# Patient Record
Sex: MALE | Race: WHITE | Employment: OTHER
[De-identification: names, ages, dates, MRNs, and addresses within clinical notes are randomized per-mention and may not be internally consistent; named-entity substitution may affect disease eponyms.]

---

## 2023-07-11 ENCOUNTER — HOSPITAL ENCOUNTER (OUTPATIENT)
Facility: HOSPITAL | Age: 68
Discharge: HOME | End: 2023-07-14
Payer: MEDICARE

## 2023-07-11 DIAGNOSIS — M54.12 CERVICAL RADICULOPATHY: ICD-10-CM

## 2023-07-11 DIAGNOSIS — M48.02 CERVICAL STENOSIS OF SPINE: ICD-10-CM

## 2023-07-11 PROCEDURE — 72141 MRI NECK SPINE W/O DYE: CPT

## 2023-10-18 ENCOUNTER — HOSPITAL ENCOUNTER (OUTPATIENT)
Facility: HOSPITAL | Age: 68
Discharge: HOME | End: 2023-10-21
Payer: MEDICARE

## 2023-10-18 VITALS
DIASTOLIC BLOOD PRESSURE: 80 MMHG | BODY MASS INDEX: 21.47 KG/M2 | TEMPERATURE: 97.8 F | SYSTOLIC BLOOD PRESSURE: 151 MMHG | HEART RATE: 55 BPM | WEIGHT: 150 LBS | HEIGHT: 70 IN

## 2023-10-18 LAB
ABO + RH BLD: NORMAL
ANION GAP SERPL CALC-SCNC: 4 MMOL/L (ref 5–15)
APPEARANCE UR: CLEAR
BACTERIA URNS QL MICRO: NEGATIVE /HPF
BILIRUB UR QL: NEGATIVE
BLOOD GROUP ANTIBODIES SERPL: NORMAL
BUN SERPL-MCNC: 8 MG/DL (ref 6–20)
BUN/CREAT SERPL: 9 (ref 12–20)
CALCIUM SERPL-MCNC: 9.7 MG/DL (ref 8.5–10.1)
CHLORIDE SERPL-SCNC: 106 MMOL/L (ref 97–108)
CO2 SERPL-SCNC: 26 MMOL/L (ref 21–32)
COLOR UR: NORMAL
CREAT SERPL-MCNC: 0.87 MG/DL (ref 0.7–1.3)
EPITH CASTS URNS QL MICRO: NORMAL /LPF
ERYTHROCYTE [DISTWIDTH] IN BLOOD BY AUTOMATED COUNT: 12.4 % (ref 11.5–14.5)
EST. AVERAGE GLUCOSE BLD GHB EST-MCNC: 105 MG/DL
GLUCOSE SERPL-MCNC: 115 MG/DL (ref 65–100)
GLUCOSE UR STRIP.AUTO-MCNC: NEGATIVE MG/DL
HBA1C MFR BLD: 5.3 % (ref 4–5.6)
HCT VFR BLD AUTO: 39.4 % (ref 36.6–50.3)
HGB BLD-MCNC: 13.4 G/DL (ref 12.1–17)
HGB UR QL STRIP: NEGATIVE
HYALINE CASTS URNS QL MICRO: NORMAL /LPF (ref 0–5)
INR PPP: 1 (ref 0.9–1.1)
KETONES UR QL STRIP.AUTO: NEGATIVE MG/DL
LEUKOCYTE ESTERASE UR QL STRIP.AUTO: NEGATIVE
MCH RBC QN AUTO: 32.7 PG (ref 26–34)
MCHC RBC AUTO-ENTMCNC: 34 G/DL (ref 30–36.5)
MCV RBC AUTO: 96.1 FL (ref 80–99)
NITRITE UR QL STRIP.AUTO: NEGATIVE
NRBC # BLD: 0 K/UL (ref 0–0.01)
NRBC BLD-RTO: 0 PER 100 WBC
PH UR STRIP: 7 (ref 5–8)
PLATELET # BLD AUTO: 322 K/UL (ref 150–400)
PMV BLD AUTO: 9.3 FL (ref 8.9–12.9)
POTASSIUM SERPL-SCNC: 4.1 MMOL/L (ref 3.5–5.1)
PROT UR STRIP-MCNC: NEGATIVE MG/DL
PROTHROMBIN TIME: 10.6 SEC (ref 9–11.1)
RBC # BLD AUTO: 4.1 M/UL (ref 4.1–5.7)
RBC #/AREA URNS HPF: NORMAL /HPF (ref 0–5)
SODIUM SERPL-SCNC: 136 MMOL/L (ref 136–145)
SP GR UR REFRACTOMETRY: 1 (ref 1–1.03)
SPECIMEN EXP DATE BLD: NORMAL
URINE CULTURE IF INDICATED: NORMAL
UROBILINOGEN UR QL STRIP.AUTO: 0.2 EU/DL (ref 0.2–1)
WBC # BLD AUTO: 6.8 K/UL (ref 4.1–11.1)
WBC URNS QL MICRO: NORMAL /HPF (ref 0–4)

## 2023-10-18 PROCEDURE — APPNB30 APP NON BILLABLE TIME 0-30 MINS: Performed by: NURSE PRACTITIONER

## 2023-10-18 PROCEDURE — 36415 COLL VENOUS BLD VENIPUNCTURE: CPT

## 2023-10-18 PROCEDURE — 80048 BASIC METABOLIC PNL TOTAL CA: CPT

## 2023-10-18 PROCEDURE — 86850 RBC ANTIBODY SCREEN: CPT

## 2023-10-18 PROCEDURE — 86900 BLOOD TYPING SEROLOGIC ABO: CPT

## 2023-10-18 PROCEDURE — 86901 BLOOD TYPING SEROLOGIC RH(D): CPT

## 2023-10-18 PROCEDURE — 85610 PROTHROMBIN TIME: CPT

## 2023-10-18 PROCEDURE — 81001 URINALYSIS AUTO W/SCOPE: CPT

## 2023-10-18 PROCEDURE — 83036 HEMOGLOBIN GLYCOSYLATED A1C: CPT

## 2023-10-18 PROCEDURE — 85027 COMPLETE CBC AUTOMATED: CPT

## 2023-10-18 RX ORDER — UBIDECARENONE 200 MG
1 CAPSULE ORAL DAILY
COMMUNITY

## 2023-10-18 NOTE — PERIOP NOTE
[id number] [address] Burrell [facility] INSTRUCTIONS  ORTHOPAEDIC    Surgery Date:   10/25/23    Your surgeon's office or [facility] staff will call you between 4 PM- 8 PM the day before surgery with your arrival time. If your surgery is on a Monday, you will receive a call the preceding Friday. If your surgeon's office has given you, your arrival time then go by that time. Please report to [facility] Patient Access/Admitting on the 1st floor. Bring your insurance card, photo identification, and any copayment (if applicable). If you are going home the same day of your surgery, you must have a responsible adult to drive you home. You need to have a responsible adult to stay with you the first 24 hours after surgery and you should not drive a car for 24 hours following your surgery. If you are being admitted to the hospital,please leave personal belongings/luggage in your car until you have an assigned hospital room number. Please wear comfortable clothes. Wear your glasses instead of contacts. We ask that all money, jewelry and valuables be left at home. Wear no make up, particularly mascara, the day of surgery. Do NOT drink alcohol or smoke 24 hours before surgery. STOP smoking for 14 days prior as it helps with breathing and healing after surgery. All body piercings, rings, and jewelry need to be removed and left at home. Do not wear any fingernail polish except for clear. Please wear your hair loose or down. Please no pony-tails, buns, or any metal hair accessories. You may wear deodorant. Do not shave any body area within 24 hours of your surgery. Please follow all instructions to avoid any potential surgical cancellation. Should your physical condition change, (i.e. fever, cold, flu, etc.) please notify your surgeon as soon as possible. It is important to be on time. If a situation occurs where you may be delayed, please call:  [phone] / [phone] on the day of surgery.   The Preadmission

## 2023-10-19 LAB
BACTERIA SPEC CULT: NORMAL
BACTERIA SPEC CULT: NORMAL
SERVICE CMNT-IMP: NORMAL

## 2023-10-25 ENCOUNTER — ANESTHESIA EVENT (OUTPATIENT)
Facility: HOSPITAL | Age: 68
End: 2023-10-25
Payer: MEDICARE

## 2023-10-25 ENCOUNTER — APPOINTMENT (OUTPATIENT)
Facility: HOSPITAL | Age: 68
End: 2023-10-25
Attending: ORTHOPAEDIC SURGERY
Payer: MEDICARE

## 2023-10-25 ENCOUNTER — HOSPITAL ENCOUNTER (OUTPATIENT)
Facility: HOSPITAL | Age: 68
Discharge: HOME | End: 2023-10-26
Attending: ORTHOPAEDIC SURGERY | Admitting: ORTHOPAEDIC SURGERY
Payer: MEDICARE

## 2023-10-25 ENCOUNTER — ANESTHESIA (OUTPATIENT)
Facility: HOSPITAL | Age: 68
End: 2023-10-25
Payer: MEDICARE

## 2023-10-25 DIAGNOSIS — Z98.1 S/P CERVICAL SPINAL FUSION: Primary | ICD-10-CM

## 2023-10-25 PROBLEM — M48.02 CERVICAL STENOSIS OF SPINAL CANAL: Status: ACTIVE | Noted: 2023-10-25

## 2023-10-25 LAB
GLUCOSE BLD STRIP.AUTO-MCNC: 105 MG/DL (ref 65–117)
SERVICE CMNT-IMP: NORMAL

## 2023-10-25 PROCEDURE — C1713 ANCHOR/SCREW BN/BN,TIS/BN: HCPCS | Performed by: ORTHOPAEDIC SURGERY

## 2023-10-25 PROCEDURE — 6360000002 HC RX W HCPCS: Performed by: STUDENT IN AN ORGANIZED HEALTH CARE EDUCATION/TRAINING PROGRAM

## 2023-10-25 PROCEDURE — 2580000003 HC RX 258: Performed by: NURSE ANESTHETIST, CERTIFIED REGISTERED

## 2023-10-25 PROCEDURE — 6360000002 HC RX W HCPCS: Performed by: PHYSICIAN ASSISTANT

## 2023-10-25 PROCEDURE — 22552 ARTHRD ANT NTRBD CERVICAL EA: CPT | Performed by: STUDENT IN AN ORGANIZED HEALTH CARE EDUCATION/TRAINING PROGRAM

## 2023-10-25 PROCEDURE — 2500000003 HC RX 250 WO HCPCS: Performed by: ORTHOPAEDIC SURGERY

## 2023-10-25 PROCEDURE — 2580000003 HC RX 258: Performed by: ORTHOPAEDIC SURGERY

## 2023-10-25 PROCEDURE — C1889 IMPLANT/INSERT DEVICE, NOC: HCPCS | Performed by: ORTHOPAEDIC SURGERY

## 2023-10-25 PROCEDURE — 2580000003 HC RX 258: Performed by: STUDENT IN AN ORGANIZED HEALTH CARE EDUCATION/TRAINING PROGRAM

## 2023-10-25 PROCEDURE — 22845 INSERT SPINE FIXATION DEVICE: CPT | Performed by: STUDENT IN AN ORGANIZED HEALTH CARE EDUCATION/TRAINING PROGRAM

## 2023-10-25 PROCEDURE — 3600000014 HC SURGERY LEVEL 4 ADDTL 15MIN: Performed by: ORTHOPAEDIC SURGERY

## 2023-10-25 PROCEDURE — 22552 ARTHRD ANT NTRBD CERVICAL EA: CPT | Performed by: ORTHOPAEDIC SURGERY

## 2023-10-25 PROCEDURE — 3700000000 HC ANESTHESIA ATTENDED CARE: Performed by: ORTHOPAEDIC SURGERY

## 2023-10-25 PROCEDURE — 3700000001 HC ADD 15 MINUTES (ANESTHESIA): Performed by: ORTHOPAEDIC SURGERY

## 2023-10-25 PROCEDURE — 22853 INSJ BIOMECHANICAL DEVICE: CPT | Performed by: STUDENT IN AN ORGANIZED HEALTH CARE EDUCATION/TRAINING PROGRAM

## 2023-10-25 PROCEDURE — 22551 ARTHRD ANT NTRBDY CERVICAL: CPT | Performed by: ORTHOPAEDIC SURGERY

## 2023-10-25 PROCEDURE — 2500000003 HC RX 250 WO HCPCS: Performed by: NURSE ANESTHETIST, CERTIFIED REGISTERED

## 2023-10-25 PROCEDURE — 82962 GLUCOSE BLOOD TEST: CPT

## 2023-10-25 PROCEDURE — 22853 INSJ BIOMECHANICAL DEVICE: CPT | Performed by: ORTHOPAEDIC SURGERY

## 2023-10-25 PROCEDURE — 2580000003 HC RX 258: Performed by: PHYSICIAN ASSISTANT

## 2023-10-25 PROCEDURE — 6360000002 HC RX W HCPCS: Performed by: NURSE ANESTHETIST, CERTIFIED REGISTERED

## 2023-10-25 PROCEDURE — 6370000000 HC RX 637 (ALT 250 FOR IP): Performed by: STUDENT IN AN ORGANIZED HEALTH CARE EDUCATION/TRAINING PROGRAM

## 2023-10-25 PROCEDURE — 2720000010 HC SURG SUPPLY STERILE: Performed by: ORTHOPAEDIC SURGERY

## 2023-10-25 PROCEDURE — 7100000000 HC PACU RECOVERY - FIRST 15 MIN: Performed by: ORTHOPAEDIC SURGERY

## 2023-10-25 PROCEDURE — 2709999900 HC NON-CHARGEABLE SUPPLY: Performed by: ORTHOPAEDIC SURGERY

## 2023-10-25 PROCEDURE — 3600000004 HC SURGERY LEVEL 4 BASE: Performed by: ORTHOPAEDIC SURGERY

## 2023-10-25 PROCEDURE — 7100000001 HC PACU RECOVERY - ADDTL 15 MIN: Performed by: ORTHOPAEDIC SURGERY

## 2023-10-25 PROCEDURE — 22551 ARTHRD ANT NTRBDY CERVICAL: CPT | Performed by: STUDENT IN AN ORGANIZED HEALTH CARE EDUCATION/TRAINING PROGRAM

## 2023-10-25 PROCEDURE — 22845 INSERT SPINE FIXATION DEVICE: CPT | Performed by: ORTHOPAEDIC SURGERY

## 2023-10-25 DEVICE — GRAFT BNE SUB SM CANC FRZN MORSELIZED W/ VIABLE CELL: Type: IMPLANTABLE DEVICE | Site: SPINE CERVICAL | Status: FUNCTIONAL

## 2023-10-25 DEVICE — I-FACTOR PUTTY, 1.0CC SYRINGE
Type: IMPLANTABLE DEVICE | Site: SPINE CERVICAL | Status: FUNCTIONAL
Brand: I-FACTOR PEPTIDE ENHANCED BONE GRAFT

## 2023-10-25 DEVICE — SCREW 7713515 ZEVO VAR SD 3.5MM X 15MM
Type: IMPLANTABLE DEVICE | Site: SPINE CERVICAL | Status: FUNCTIONAL
Brand: ZEVO™ ANTERIOR CERVICAL PLATE SYSTEM

## 2023-10-25 DEVICE — GRAFT HUM TISS 3X4 CM WND COVERING AMNIO MEMBRN VERSASHIELD: Type: IMPLANTABLE DEVICE | Site: SPINE CERVICAL | Status: FUNCTIONAL

## 2023-10-25 DEVICE — INTERBODY FUSION DEVICE  6 DEGREE MEDIUM 8MM
Type: IMPLANTABLE DEVICE | Site: SPINE CERVICAL | Status: FUNCTIONAL
Brand: ENDOSKELETON® TC NANOLOCK® SURFACE TECHNOLOGY

## 2023-10-25 RX ORDER — ONDANSETRON 2 MG/ML
4 INJECTION INTRAMUSCULAR; INTRAVENOUS EVERY 6 HOURS PRN
Status: DISCONTINUED | OUTPATIENT
Start: 2023-10-25 | End: 2023-10-26 | Stop reason: HOSPADM

## 2023-10-25 RX ORDER — ATENOLOL 50 MG/1
50 TABLET ORAL DAILY
Status: DISCONTINUED | OUTPATIENT
Start: 2023-10-25 | End: 2023-10-26 | Stop reason: HOSPADM

## 2023-10-25 RX ORDER — SODIUM CHLORIDE, SODIUM LACTATE, POTASSIUM CHLORIDE, CALCIUM CHLORIDE 600; 310; 30; 20 MG/100ML; MG/100ML; MG/100ML; MG/100ML
INJECTION, SOLUTION INTRAVENOUS CONTINUOUS
Status: DISCONTINUED | OUTPATIENT
Start: 2023-10-25 | End: 2023-10-26 | Stop reason: HOSPADM

## 2023-10-25 RX ORDER — SODIUM CHLORIDE 0.9 % (FLUSH) 0.9 %
5-40 SYRINGE (ML) INJECTION PRN
Status: DISCONTINUED | OUTPATIENT
Start: 2023-10-25 | End: 2023-10-25 | Stop reason: HOSPADM

## 2023-10-25 RX ORDER — ONDANSETRON 4 MG/1
4 TABLET, ORALLY DISINTEGRATING ORAL EVERY 8 HOURS PRN
Status: DISCONTINUED | OUTPATIENT
Start: 2023-10-25 | End: 2023-10-26 | Stop reason: HOSPADM

## 2023-10-25 RX ORDER — SODIUM CHLORIDE 9 MG/ML
INJECTION, SOLUTION INTRAVENOUS PRN
Status: DISCONTINUED | OUTPATIENT
Start: 2023-10-25 | End: 2023-10-26 | Stop reason: HOSPADM

## 2023-10-25 RX ORDER — HYDROMORPHONE HYDROCHLORIDE 1 MG/ML
INJECTION, SOLUTION INTRAMUSCULAR; INTRAVENOUS; SUBCUTANEOUS PRN
Status: DISCONTINUED | OUTPATIENT
Start: 2023-10-25 | End: 2023-10-25 | Stop reason: SDUPTHER

## 2023-10-25 RX ORDER — SODIUM CHLORIDE 0.9 % (FLUSH) 0.9 %
5-40 SYRINGE (ML) INJECTION EVERY 12 HOURS SCHEDULED
Status: DISCONTINUED | OUTPATIENT
Start: 2023-10-25 | End: 2023-10-25 | Stop reason: HOSPADM

## 2023-10-25 RX ORDER — MORPHINE SULFATE 2 MG/ML
2 INJECTION, SOLUTION INTRAMUSCULAR; INTRAVENOUS
Status: DISCONTINUED | OUTPATIENT
Start: 2023-10-25 | End: 2023-10-26 | Stop reason: HOSPADM

## 2023-10-25 RX ORDER — ASPIRIN 81 MG/1
81 TABLET ORAL DAILY
Status: DISCONTINUED | OUTPATIENT
Start: 2023-10-26 | End: 2023-10-26 | Stop reason: HOSPADM

## 2023-10-25 RX ORDER — ROCURONIUM BROMIDE 10 MG/ML
INJECTION, SOLUTION INTRAVENOUS PRN
Status: DISCONTINUED | OUTPATIENT
Start: 2023-10-25 | End: 2023-10-25 | Stop reason: SDUPTHER

## 2023-10-25 RX ORDER — CYCLOBENZAPRINE HCL 10 MG
10 TABLET ORAL EVERY 12 HOURS PRN
Status: DISCONTINUED | OUTPATIENT
Start: 2023-10-25 | End: 2023-10-26 | Stop reason: HOSPADM

## 2023-10-25 RX ORDER — SODIUM CHLORIDE, SODIUM LACTATE, POTASSIUM CHLORIDE, AND CALCIUM CHLORIDE .6; .31; .03; .02 G/100ML; G/100ML; G/100ML; G/100ML
INJECTION, SOLUTION INTRAVENOUS PRN
Status: DISCONTINUED | OUTPATIENT
Start: 2023-10-25 | End: 2023-10-25 | Stop reason: SDUPTHER

## 2023-10-25 RX ORDER — OXYCODONE HYDROCHLORIDE 5 MG/1
5 TABLET ORAL EVERY 4 HOURS PRN
Status: DISCONTINUED | OUTPATIENT
Start: 2023-10-25 | End: 2023-10-26 | Stop reason: HOSPADM

## 2023-10-25 RX ORDER — AMLODIPINE BESYLATE 5 MG/1
5 TABLET ORAL DAILY
Status: DISCONTINUED | OUTPATIENT
Start: 2023-10-25 | End: 2023-10-26 | Stop reason: HOSPADM

## 2023-10-25 RX ORDER — DIPHENHYDRAMINE HCL 25 MG
25 CAPSULE ORAL EVERY 6 HOURS PRN
Status: DISCONTINUED | OUTPATIENT
Start: 2023-10-25 | End: 2023-10-26 | Stop reason: HOSPADM

## 2023-10-25 RX ORDER — MIDAZOLAM HYDROCHLORIDE 1 MG/ML
INJECTION INTRAMUSCULAR; INTRAVENOUS PRN
Status: DISCONTINUED | OUTPATIENT
Start: 2023-10-25 | End: 2023-10-25 | Stop reason: SDUPTHER

## 2023-10-25 RX ORDER — SENNA AND DOCUSATE SODIUM 50; 8.6 MG/1; MG/1
1 TABLET, FILM COATED ORAL 2 TIMES DAILY
Status: DISCONTINUED | OUTPATIENT
Start: 2023-10-25 | End: 2023-10-26 | Stop reason: HOSPADM

## 2023-10-25 RX ORDER — SODIUM CHLORIDE 9 MG/ML
INJECTION, SOLUTION INTRAVENOUS CONTINUOUS
Status: DISCONTINUED | OUTPATIENT
Start: 2023-10-25 | End: 2023-10-26 | Stop reason: HOSPADM

## 2023-10-25 RX ORDER — LISINOPRIL 10 MG/1
5 TABLET ORAL DAILY
Status: DISCONTINUED | OUTPATIENT
Start: 2023-10-25 | End: 2023-10-26 | Stop reason: HOSPADM

## 2023-10-25 RX ORDER — ISOSORBIDE MONONITRATE 30 MG/1
30 TABLET, EXTENDED RELEASE ORAL EVERY MORNING
Status: DISCONTINUED | OUTPATIENT
Start: 2023-10-25 | End: 2023-10-26 | Stop reason: HOSPADM

## 2023-10-25 RX ORDER — UBIDECARENONE 200 MG
1 CAPSULE ORAL DAILY
Status: DISCONTINUED | OUTPATIENT
Start: 2023-10-25 | End: 2023-10-25 | Stop reason: CLARIF

## 2023-10-25 RX ORDER — RANOLAZINE 500 MG/1
500 TABLET, EXTENDED RELEASE ORAL 2 TIMES DAILY
Status: DISCONTINUED | OUTPATIENT
Start: 2023-10-25 | End: 2023-10-26 | Stop reason: HOSPADM

## 2023-10-25 RX ORDER — KETAMINE HCL IN NACL, ISO-OSM 100MG/10ML
SYRINGE (ML) INJECTION PRN
Status: DISCONTINUED | OUTPATIENT
Start: 2023-10-25 | End: 2023-10-25 | Stop reason: SDUPTHER

## 2023-10-25 RX ORDER — LIDOCAINE HYDROCHLORIDE 20 MG/ML
INJECTION, SOLUTION EPIDURAL; INFILTRATION; INTRACAUDAL; PERINEURAL PRN
Status: DISCONTINUED | OUTPATIENT
Start: 2023-10-25 | End: 2023-10-25 | Stop reason: SDUPTHER

## 2023-10-25 RX ORDER — FAMOTIDINE 20 MG/1
20 TABLET, FILM COATED ORAL 2 TIMES DAILY
Status: DISCONTINUED | OUTPATIENT
Start: 2023-10-25 | End: 2023-10-26 | Stop reason: HOSPADM

## 2023-10-25 RX ORDER — SODIUM CHLORIDE 0.9 % (FLUSH) 0.9 %
5-40 SYRINGE (ML) INJECTION EVERY 12 HOURS SCHEDULED
Status: DISCONTINUED | OUTPATIENT
Start: 2023-10-25 | End: 2023-10-26 | Stop reason: HOSPADM

## 2023-10-25 RX ORDER — FENTANYL CITRATE 50 UG/ML
INJECTION, SOLUTION INTRAMUSCULAR; INTRAVENOUS PRN
Status: DISCONTINUED | OUTPATIENT
Start: 2023-10-25 | End: 2023-10-25 | Stop reason: SDUPTHER

## 2023-10-25 RX ORDER — ONDANSETRON 2 MG/ML
INJECTION INTRAMUSCULAR; INTRAVENOUS PRN
Status: DISCONTINUED | OUTPATIENT
Start: 2023-10-25 | End: 2023-10-25 | Stop reason: SDUPTHER

## 2023-10-25 RX ORDER — SODIUM CHLORIDE 0.9 % (FLUSH) 0.9 %
5-40 SYRINGE (ML) INJECTION PRN
Status: DISCONTINUED | OUTPATIENT
Start: 2023-10-25 | End: 2023-10-26 | Stop reason: HOSPADM

## 2023-10-25 RX ORDER — OXYCODONE HYDROCHLORIDE 5 MG/1
10 TABLET ORAL EVERY 4 HOURS PRN
Status: DISCONTINUED | OUTPATIENT
Start: 2023-10-25 | End: 2023-10-26 | Stop reason: HOSPADM

## 2023-10-25 RX ORDER — ACETAMINOPHEN 500 MG
1000 TABLET ORAL EVERY 6 HOURS
Status: DISCONTINUED | OUTPATIENT
Start: 2023-10-25 | End: 2023-10-26 | Stop reason: HOSPADM

## 2023-10-25 RX ORDER — HYDRALAZINE HYDROCHLORIDE 20 MG/ML
INJECTION INTRAMUSCULAR; INTRAVENOUS PRN
Status: DISCONTINUED | OUTPATIENT
Start: 2023-10-25 | End: 2023-10-25 | Stop reason: SDUPTHER

## 2023-10-25 RX ORDER — SUCCINYLCHOLINE/SOD CL,ISO/PF 200MG/10ML
SYRINGE (ML) INTRAVENOUS PRN
Status: DISCONTINUED | OUTPATIENT
Start: 2023-10-25 | End: 2023-10-25 | Stop reason: SDUPTHER

## 2023-10-25 RX ORDER — PROPOFOL 10 MG/ML
INJECTION, EMULSION INTRAVENOUS PRN
Status: DISCONTINUED | OUTPATIENT
Start: 2023-10-25 | End: 2023-10-25 | Stop reason: SDUPTHER

## 2023-10-25 RX ORDER — DIPHENHYDRAMINE HYDROCHLORIDE 50 MG/ML
25 INJECTION INTRAMUSCULAR; INTRAVENOUS EVERY 6 HOURS PRN
Status: DISCONTINUED | OUTPATIENT
Start: 2023-10-25 | End: 2023-10-26 | Stop reason: HOSPADM

## 2023-10-25 RX ORDER — POLYETHYLENE GLYCOL 3350 17 G/17G
17 POWDER, FOR SOLUTION ORAL DAILY
Status: DISCONTINUED | OUTPATIENT
Start: 2023-10-25 | End: 2023-10-26 | Stop reason: HOSPADM

## 2023-10-25 RX ORDER — PHENYLEPHRINE HCL IN 0.9% NACL 0.4MG/10ML
SYRINGE (ML) INTRAVENOUS PRN
Status: DISCONTINUED | OUTPATIENT
Start: 2023-10-25 | End: 2023-10-25 | Stop reason: SDUPTHER

## 2023-10-25 RX ORDER — DEXAMETHASONE SODIUM PHOSPHATE 4 MG/ML
INJECTION, SOLUTION INTRA-ARTICULAR; INTRALESIONAL; INTRAMUSCULAR; INTRAVENOUS; SOFT TISSUE PRN
Status: DISCONTINUED | OUTPATIENT
Start: 2023-10-25 | End: 2023-10-25 | Stop reason: SDUPTHER

## 2023-10-25 RX ORDER — ATORVASTATIN CALCIUM 40 MG/1
40 TABLET, FILM COATED ORAL DAILY
Status: DISCONTINUED | OUTPATIENT
Start: 2023-10-25 | End: 2023-10-26 | Stop reason: HOSPADM

## 2023-10-25 RX ORDER — SODIUM CHLORIDE 9 MG/ML
INJECTION, SOLUTION INTRAVENOUS PRN
Status: DISCONTINUED | OUTPATIENT
Start: 2023-10-25 | End: 2023-10-25 | Stop reason: HOSPADM

## 2023-10-25 RX ADMIN — AMLODIPINE BESYLATE 5 MG: 5 TABLET ORAL at 12:22

## 2023-10-25 RX ADMIN — ACETAMINOPHEN 1000 MG: 500 TABLET ORAL at 17:25

## 2023-10-25 RX ADMIN — ROCURONIUM BROMIDE 5 MG: 10 INJECTION, SOLUTION INTRAVENOUS at 07:42

## 2023-10-25 RX ADMIN — MIDAZOLAM 2 MG: 1 INJECTION INTRAMUSCULAR; INTRAVENOUS at 07:31

## 2023-10-25 RX ADMIN — HYDROMORPHONE HYDROCHLORIDE 0.5 MG: 1 INJECTION, SOLUTION INTRAMUSCULAR; INTRAVENOUS; SUBCUTANEOUS at 08:05

## 2023-10-25 RX ADMIN — POLYETHYLENE GLYCOL 3350 17 G: 17 POWDER, FOR SOLUTION ORAL at 12:25

## 2023-10-25 RX ADMIN — PROPOFOL 140 MG: 10 INJECTION, EMULSION INTRAVENOUS at 08:12

## 2023-10-25 RX ADMIN — ONDANSETRON 4 MG: 2 INJECTION INTRAMUSCULAR; INTRAVENOUS at 09:18

## 2023-10-25 RX ADMIN — DEXAMETHASONE SODIUM PHOSPHATE 4 MG: 4 INJECTION INTRA-ARTICULAR; INTRALESIONAL; INTRAMUSCULAR; INTRAVENOUS; SOFT TISSUE at 07:46

## 2023-10-25 RX ADMIN — Medication 120 MCG: at 08:56

## 2023-10-25 RX ADMIN — SODIUM CHLORIDE, POTASSIUM CHLORIDE, SODIUM LACTATE AND CALCIUM CHLORIDE: 600; 310; 30; 20 INJECTION, SOLUTION INTRAVENOUS at 06:45

## 2023-10-25 RX ADMIN — LIDOCAINE HYDROCHLORIDE 70 MG: 20 INJECTION, SOLUTION EPIDURAL; INFILTRATION; INTRACAUDAL; PERINEURAL at 07:42

## 2023-10-25 RX ADMIN — ATORVASTATIN CALCIUM 40 MG: 40 TABLET, FILM COATED ORAL at 12:23

## 2023-10-25 RX ADMIN — Medication 10 MG: at 08:15

## 2023-10-25 RX ADMIN — SENNOSIDES AND DOCUSATE SODIUM 1 TABLET: 8.6; 5 TABLET ORAL at 12:25

## 2023-10-25 RX ADMIN — PROPOFOL 50 MG: 10 INJECTION, EMULSION INTRAVENOUS at 08:11

## 2023-10-25 RX ADMIN — Medication 140 MG: at 07:42

## 2023-10-25 RX ADMIN — HYDROMORPHONE HYDROCHLORIDE 0.5 MG: 1 INJECTION, SOLUTION INTRAMUSCULAR; INTRAVENOUS; SUBCUTANEOUS at 08:12

## 2023-10-25 RX ADMIN — SODIUM CHLORIDE, POTASSIUM CHLORIDE, SODIUM LACTATE AND CALCIUM CHLORIDE 450 ML: 600; 310; 30; 20 INJECTION, SOLUTION INTRAVENOUS at 08:31

## 2023-10-25 RX ADMIN — Medication 80 MCG: at 09:03

## 2023-10-25 RX ADMIN — ISOSORBIDE MONONITRATE 30 MG: 30 TABLET, EXTENDED RELEASE ORAL at 12:27

## 2023-10-25 RX ADMIN — HYDRALAZINE HYDROCHLORIDE 6 MG: 20 INJECTION INTRAMUSCULAR; INTRAVENOUS at 08:29

## 2023-10-25 RX ADMIN — FENTANYL CITRATE 50 MCG: 50 INJECTION, SOLUTION INTRAMUSCULAR; INTRAVENOUS at 07:50

## 2023-10-25 RX ADMIN — RANOLAZINE 500 MG: 500 TABLET, FILM COATED, EXTENDED RELEASE ORAL at 12:27

## 2023-10-25 RX ADMIN — LISINOPRIL 5 MG: 10 TABLET ORAL at 12:22

## 2023-10-25 RX ADMIN — SODIUM CHLORIDE: 9 INJECTION, SOLUTION INTRAVENOUS at 10:09

## 2023-10-25 RX ADMIN — PROPOFOL 150 MG: 10 INJECTION, EMULSION INTRAVENOUS at 07:42

## 2023-10-25 RX ADMIN — Medication 10 MG: at 08:11

## 2023-10-25 RX ADMIN — CYCLOBENZAPRINE 10 MG: 10 TABLET, FILM COATED ORAL at 12:22

## 2023-10-25 RX ADMIN — Medication 10 MG: at 08:19

## 2023-10-25 RX ADMIN — ATENOLOL 50 MG: 50 TABLET ORAL at 12:27

## 2023-10-25 RX ADMIN — WATER 2000 MG: 1 INJECTION INTRAMUSCULAR; INTRAVENOUS; SUBCUTANEOUS at 07:52

## 2023-10-25 RX ADMIN — FAMOTIDINE 20 MG: 20 TABLET ORAL at 20:58

## 2023-10-25 RX ADMIN — WATER 2000 MG: 1 INJECTION INTRAMUSCULAR; INTRAVENOUS; SUBCUTANEOUS at 17:22

## 2023-10-25 RX ADMIN — Medication 10 MG: at 08:23

## 2023-10-25 RX ADMIN — RANOLAZINE 500 MG: 500 TABLET, FILM COATED, EXTENDED RELEASE ORAL at 20:58

## 2023-10-25 RX ADMIN — FAMOTIDINE 20 MG: 20 TABLET ORAL at 12:23

## 2023-10-25 RX ADMIN — Medication 10 MG: at 08:07

## 2023-10-25 RX ADMIN — SODIUM CHLORIDE, POTASSIUM CHLORIDE, SODIUM LACTATE AND CALCIUM CHLORIDE 350 ML: 600; 310; 30; 20 INJECTION, SOLUTION INTRAVENOUS at 09:42

## 2023-10-25 RX ADMIN — SENNOSIDES AND DOCUSATE SODIUM 1 TABLET: 8.6; 5 TABLET ORAL at 20:58

## 2023-10-25 RX ADMIN — FENTANYL CITRATE 50 MCG: 50 INJECTION, SOLUTION INTRAMUSCULAR; INTRAVENOUS at 07:49

## 2023-10-25 RX ADMIN — ACETAMINOPHEN 1000 MG: 500 TABLET ORAL at 12:21

## 2023-10-25 RX ADMIN — PROPOFOL 140 MCG/KG/MIN: 10 INJECTION, EMULSION INTRAVENOUS at 07:45

## 2023-10-25 ASSESSMENT — PAIN - FUNCTIONAL ASSESSMENT: PAIN_FUNCTIONAL_ASSESSMENT: 0-10

## 2023-10-25 NOTE — OP NOTE
Neuromonitoring remained stable after each implant was placed. Weight was removed off the head. We then contoured a 37-mm anterior cervical plate. A temporary fixation with threaded pin, followed by a 3.5 x 17 mm screws in C4 for fixation and then 3.5 x 15 mm in C5-C6 fixation. Locking mechanisms were engaged. X-rays demonstrated stable alignment and fixation with restoration of normal alignment and disk space height. Wounds were thoroughly irrigated. FloSeal for hemostasis. VersaShield for anti-adhesions. 3-0 Monocryl on the skin and Steri-Strips were placed. A soft collar was placed. The patient returned to the PACU in stable condition.       Isabela Pearl MD      [id number]  D:  10/25/2023 9:17  T:  10/25/2023 9:56  JOB #:  [id number]

## 2023-10-25 NOTE — BRIEF OP NOTE
Brief Postoperative Note      Patient: Temo Starr  YOB: 1955  MRN: [id number]    Date of Procedure: 10/25/2023    Pre-Op Diagnosis Codes:     * Cervical radiculopathy [M54.12]     * Cervical spinal stenosis [M48.02]    Post-Op Diagnosis: Same       Procedure(s):  ANTERIOR CERVICAL DISCECTOMY AND FUSION C4-5 AND C5-6    Surgeon(s):  Lex Alvarado MD    Assistant:  Physician Assistant: ROLA Wallace    Anesthesia: General    Estimated Blood Loss (mL): Minimal    Complications: None    Specimens:   * No specimens in log *    Implants:  Implant Name Type Inv. Item Serial No.  Lot No. LRB No. Used Action   GRAFT BNE SUB SM CANC FRZN MORSELIZED W/ VIABLE CELL - [id number]  GRAFT BNE SUB SM CANC FRZN MORSELIZED W/ VIABLE CELL [id number] [employer]- NA N/A 1 Implanted   GRAFT HUM TISS 3X4 CM WND COVERING AMNIO MEMBRN VERSASHIELD - [id number]  GRAFT HUM TISS 3X4 CM WND COVERING AMNIO MEMBRN VERSASHIELD [id number] [employer]- NA N/A 1 Implanted   PUTTY BNE GRFT 1 CC W/ SYR I FACTOR - SNA  PUTTY BNE GRFT 1 CC W/ SYR I FACTOR NA [employer] [id number] N/A 1 Implanted   CAGE SPNL 6 DEG MED [id number] MM ENDOSKELETON TC [place] - SNA  CAGE SPNL 6 DEG MED [id number] MM ENDOSKELETON TC [place] NA [employer] SOFAM[employer] DANEK-WD [id number] N/A 1 Implanted   CAGE SPNL 6 DEG MED [id number] MM ENDOSKELETON TC [place] - SNA  CAGE SPNL 6 DEG MED [id number] MM ENDOSKELETON TC [place] NA [employer] SOFAMOR DANEK-WD [id number] N/A 1 Implanted   SCREW SPNL L15MM DIA3. 5MM ANT CERV TI SELF DRL ANNIE ANG - SNA  SCREW SPNL L15MM DIA3. 5MM ANT CERV TI SELF DRL ANNIE ANG NA [employer] SOFAMOR DANEK-WD NA N/A 4 Implanted   PLATE SPNL F39UV LEV 2 ANT CERV TI ZEVO - SNA  PLATE SPNL L51PC LEV 2 ANT CERV TI ZEVO NA [employer] SOFAMOR DANEK-WD NA N/A 1 Implanted   SCREW SPNL L17MM DIA3. 5MM ANT CERV TI SELF DRL ANNIE ANG - SNA  SCREW SPNL L17MM

## 2023-10-25 NOTE — DISCHARGE INSTRUCTIONS
After Hospital Care Plan:  Discharge Instructions Cervical (Neck) Spine Surgery Dr. Anna Burns    Patient Name: Teddy Ramos    Date of procedure: 10/25/23  Date of discharge: 10/25/2023    Procedure: Procedure(s):  ANTERIOR CERVICAL DISCECTOMY AND FUSION C4-5 AND C5-6      Follow up appointments   -follow up with Dr. Anna Burns in 2 weeks. Call [phone] to make an appointment as soon as you get home from the hospital.    When to call your Orthopaedic Surgeon:  -Difficulty swallowing that is worse than when you left the hospital.  -Signs of infection-if your incision is red; continues to have drainage; drainage has a foul odor or if you have a persistent fever over 101 degrees for 24 hours  -nausea or vomiting, severe headache  -changes in sensation in your arms or legs (numbness, tingling, loss of color)  -increased weakness-greater than before your surgery  -severe pain or pain not relieved by medications  -Signs of a blood clot in your leg-calf pain, tenderness, redness, swelling of lower leg    When to call your Primary Care Physician:  -Concerns about medical conditions such as diabetes, high blood pressure, asthma, congestive heart failure  -Call if blood sugars are elevated, persistent headache or dizziness, coughing or congestion, constipation or diarrhea, burning with urination, abnormal heart rate    When to call 911 and go to the nearest emergency room:  -acute onset of chest pain, shortness of breath, difficulty breathing, increased difficulty swallowing    Activity  - You are going home a well person, be as active as possible. Your only exercise should be walking. Start with short frequent walks and increase your walking distance each day.  -Limit the amount of time you sit to 20-30 minute intervals. Sitting for prolonged periods of time will be uncomfortable for you following surgery.   - Do NOT lift anything over 5 pounds  -From now on, even when lifting light weight, bend with your

## 2023-10-26 VITALS
BODY MASS INDEX: 22.2 KG/M2 | WEIGHT: 149.91 LBS | DIASTOLIC BLOOD PRESSURE: 79 MMHG | HEIGHT: 69 IN | OXYGEN SATURATION: 97 % | TEMPERATURE: 98 F | RESPIRATION RATE: 15 BRPM | SYSTOLIC BLOOD PRESSURE: 150 MMHG | HEART RATE: 59 BPM

## 2023-10-26 PROCEDURE — 6370000000 HC RX 637 (ALT 250 FOR IP): Performed by: STUDENT IN AN ORGANIZED HEALTH CARE EDUCATION/TRAINING PROGRAM

## 2023-10-26 PROCEDURE — 97161 PT EVAL LOW COMPLEX 20 MIN: CPT

## 2023-10-26 PROCEDURE — 97116 GAIT TRAINING THERAPY: CPT

## 2023-10-26 PROCEDURE — 2580000003 HC RX 258: Performed by: STUDENT IN AN ORGANIZED HEALTH CARE EDUCATION/TRAINING PROGRAM

## 2023-10-26 PROCEDURE — 6360000002 HC RX W HCPCS: Performed by: STUDENT IN AN ORGANIZED HEALTH CARE EDUCATION/TRAINING PROGRAM

## 2023-10-26 RX ORDER — OXYCODONE HYDROCHLORIDE 5 MG/1
5 TABLET ORAL EVERY 4 HOURS PRN
Qty: 20 TABLET | Refills: 0 | Status: SHIPPED | OUTPATIENT
Start: 2023-10-26 | End: 2023-11-02

## 2023-10-26 RX ADMIN — LISINOPRIL 5 MG: 10 TABLET ORAL at 09:20

## 2023-10-26 RX ADMIN — AMLODIPINE BESYLATE 5 MG: 5 TABLET ORAL at 09:20

## 2023-10-26 RX ADMIN — RANOLAZINE 500 MG: 500 TABLET, FILM COATED, EXTENDED RELEASE ORAL at 09:21

## 2023-10-26 RX ADMIN — WATER 2000 MG: 1 INJECTION INTRAMUSCULAR; INTRAVENOUS; SUBCUTANEOUS at 00:30

## 2023-10-26 RX ADMIN — SENNOSIDES AND DOCUSATE SODIUM 1 TABLET: 8.6; 5 TABLET ORAL at 09:20

## 2023-10-26 RX ADMIN — POLYETHYLENE GLYCOL 3350 17 G: 17 POWDER, FOR SOLUTION ORAL at 09:21

## 2023-10-26 RX ADMIN — ATORVASTATIN CALCIUM 40 MG: 40 TABLET, FILM COATED ORAL at 09:20

## 2023-10-26 RX ADMIN — ISOSORBIDE MONONITRATE 30 MG: 30 TABLET, EXTENDED RELEASE ORAL at 09:20

## 2023-10-26 RX ADMIN — OXYCODONE HYDROCHLORIDE 10 MG: 5 TABLET ORAL at 12:01

## 2023-10-26 RX ADMIN — ASPIRIN 81 MG: 81 TABLET, COATED ORAL at 09:20

## 2023-10-26 RX ADMIN — TICAGRELOR 90 MG: 90 TABLET ORAL at 09:20

## 2023-10-26 RX ADMIN — FAMOTIDINE 20 MG: 20 TABLET ORAL at 09:21

## 2023-10-26 RX ADMIN — ATENOLOL 50 MG: 50 TABLET ORAL at 09:20

## 2023-10-26 RX ADMIN — SODIUM CHLORIDE, PRESERVATIVE FREE 10 ML: 5 INJECTION INTRAVENOUS at 09:22

## 2023-10-26 RX ADMIN — ACETAMINOPHEN 1000 MG: 500 TABLET ORAL at 12:01

## 2023-10-26 RX ADMIN — ACETAMINOPHEN 1000 MG: 500 TABLET ORAL at 01:06

## 2023-10-26 ASSESSMENT — PAIN DESCRIPTION - LOCATION: LOCATION: NECK

## 2023-10-26 ASSESSMENT — PAIN SCALES - GENERAL: PAINLEVEL_OUTOF10: 6

## 2023-10-26 ASSESSMENT — PAIN DESCRIPTION - DESCRIPTORS: DESCRIPTORS: ACHING

## 2023-10-26 NOTE — PLAN OF CARE
Problem: Pain  Goal: Verbalizes/displays adequate comfort level or baseline comfort level  Outcome: HH/HSPC Resolved Met     Problem: Safety - Adult  Goal: Free from fall injury  Outcome: [facility] 114 Resolved Met     Problem: Discharge Planning  Goal: Discharge to home or other facility with appropriate resources  Outcome: [facility] 114 Resolved Met

## 2023-12-26 ENCOUNTER — APPOINTMENT (OUTPATIENT)
Facility: HOSPITAL | Age: 68
End: 2023-12-26
Payer: MEDICARE

## 2023-12-26 ENCOUNTER — HOSPITAL ENCOUNTER (OUTPATIENT)
Facility: HOSPITAL | Age: 68
Setting detail: OBSERVATION
Discharge: HOME | End: 2023-12-28
Attending: STUDENT IN AN ORGANIZED HEALTH CARE EDUCATION/TRAINING PROGRAM | Admitting: ORTHOPAEDIC SURGERY
Payer: MEDICARE

## 2023-12-26 DIAGNOSIS — S82.852A TRIMALLEOLAR FRACTURE OF ANKLE, CLOSED, LEFT, INITIAL ENCOUNTER: Primary | ICD-10-CM

## 2023-12-26 DIAGNOSIS — S82.852A CLOSED FRACTURE OF ANKLE, TRIMALLEOLAR, LEFT, INITIAL ENCOUNTER: ICD-10-CM

## 2023-12-26 LAB
ABO + RH BLD: NORMAL
ANION GAP SERPL CALC-SCNC: 7 MMOL/L (ref 5–15)
BASOPHILS # BLD: 0 K/UL (ref 0–0.1)
BASOPHILS NFR BLD: 0 % (ref 0–1)
BLOOD GROUP ANTIBODIES SERPL: NORMAL
BUN SERPL-MCNC: 8 MG/DL (ref 6–20)
BUN/CREAT SERPL: 14 (ref 12–20)
CALCIUM SERPL-MCNC: 9.1 MG/DL (ref 8.5–10.1)
CHLORIDE SERPL-SCNC: 106 MMOL/L (ref 97–108)
CO2 SERPL-SCNC: 24 MMOL/L (ref 21–32)
CREAT SERPL-MCNC: 0.59 MG/DL (ref 0.7–1.3)
DIFFERENTIAL METHOD BLD: ABNORMAL
EOSINOPHIL # BLD: 0.1 K/UL (ref 0–0.4)
EOSINOPHIL NFR BLD: 1 % (ref 0–7)
ERYTHROCYTE [DISTWIDTH] IN BLOOD BY AUTOMATED COUNT: 12.8 % (ref 11.5–14.5)
GLUCOSE SERPL-MCNC: 114 MG/DL (ref 65–100)
HCT VFR BLD AUTO: 36.1 % (ref 36.6–50.3)
HGB BLD-MCNC: 12.3 G/DL (ref 12.1–17)
IMM GRANULOCYTES # BLD AUTO: 0.1 K/UL (ref 0–0.04)
IMM GRANULOCYTES NFR BLD AUTO: 1 % (ref 0–0.5)
LYMPHOCYTES # BLD: 1.8 K/UL (ref 0.8–3.5)
LYMPHOCYTES NFR BLD: 17 % (ref 12–49)
MCH RBC QN AUTO: 32.9 PG (ref 26–34)
MCHC RBC AUTO-ENTMCNC: 34.1 G/DL (ref 30–36.5)
MCV RBC AUTO: 96.5 FL (ref 80–99)
MONOCYTES # BLD: 1 K/UL (ref 0–1)
MONOCYTES NFR BLD: 9 % (ref 5–13)
NEUTS SEG # BLD: 7.5 K/UL (ref 1.8–8)
NEUTS SEG NFR BLD: 72 % (ref 32–75)
NRBC # BLD: 0 K/UL (ref 0–0.01)
NRBC BLD-RTO: 0 PER 100 WBC
PLATELET # BLD AUTO: 279 K/UL (ref 150–400)
PMV BLD AUTO: 8.9 FL (ref 8.9–12.9)
POTASSIUM SERPL-SCNC: 3.7 MMOL/L (ref 3.5–5.1)
RBC # BLD AUTO: 3.74 M/UL (ref 4.1–5.7)
SODIUM SERPL-SCNC: 137 MMOL/L (ref 136–145)
SPECIMEN EXP DATE BLD: NORMAL
WBC # BLD AUTO: 10.5 K/UL (ref 4.1–11.1)

## 2023-12-26 PROCEDURE — 71045 X-RAY EXAM CHEST 1 VIEW: CPT

## 2023-12-26 PROCEDURE — 2580000003 HC RX 258: Performed by: PHYSICIAN ASSISTANT

## 2023-12-26 PROCEDURE — 96376 TX/PRO/DX INJ SAME DRUG ADON: CPT

## 2023-12-26 PROCEDURE — 36415 COLL VENOUS BLD VENIPUNCTURE: CPT

## 2023-12-26 PROCEDURE — 86901 BLOOD TYPING SEROLOGIC RH(D): CPT

## 2023-12-26 PROCEDURE — 85025 COMPLETE CBC W/AUTO DIFF WBC: CPT

## 2023-12-26 PROCEDURE — 73610 X-RAY EXAM OF ANKLE: CPT

## 2023-12-26 PROCEDURE — 86900 BLOOD TYPING SEROLOGIC ABO: CPT

## 2023-12-26 PROCEDURE — G0378 HOSPITAL OBSERVATION PER HR: HCPCS

## 2023-12-26 PROCEDURE — 96374 THER/PROPH/DIAG INJ IV PUSH: CPT

## 2023-12-26 PROCEDURE — 80048 BASIC METABOLIC PNL TOTAL CA: CPT

## 2023-12-26 PROCEDURE — 2580000003 HC RX 258: Performed by: STUDENT IN AN ORGANIZED HEALTH CARE EDUCATION/TRAINING PROGRAM

## 2023-12-26 PROCEDURE — 86850 RBC ANTIBODY SCREEN: CPT

## 2023-12-26 PROCEDURE — 6360000002 HC RX W HCPCS: Performed by: PHYSICIAN ASSISTANT

## 2023-12-26 PROCEDURE — 6370000000 HC RX 637 (ALT 250 FOR IP): Performed by: STUDENT IN AN ORGANIZED HEALTH CARE EDUCATION/TRAINING PROGRAM

## 2023-12-26 PROCEDURE — G0378 HOSPITAL OBSERVATION PER HR: HCPCS | Performed by: ORTHOPAEDIC SURGERY

## 2023-12-26 PROCEDURE — 6370000000 HC RX 637 (ALT 250 FOR IP): Performed by: PHYSICIAN ASSISTANT

## 2023-12-26 PROCEDURE — 96375 TX/PRO/DX INJ NEW DRUG ADDON: CPT

## 2023-12-26 PROCEDURE — 2500000003 HC RX 250 WO HCPCS: Performed by: PHYSICIAN ASSISTANT

## 2023-12-26 PROCEDURE — 99285 EMERGENCY DEPT VISIT HI MDM: CPT

## 2023-12-26 PROCEDURE — 6360000002 HC RX W HCPCS: Performed by: STUDENT IN AN ORGANIZED HEALTH CARE EDUCATION/TRAINING PROGRAM

## 2023-12-26 PROCEDURE — 93005 ELECTROCARDIOGRAM TRACING: CPT | Performed by: PHYSICIAN ASSISTANT

## 2023-12-26 RX ORDER — SODIUM CHLORIDE 9 MG/ML
INJECTION, SOLUTION INTRAVENOUS PRN
Status: DISCONTINUED | OUTPATIENT
Start: 2023-12-26 | End: 2023-12-28 | Stop reason: HOSPADM

## 2023-12-26 RX ORDER — ASPIRIN 81 MG/1
81 TABLET ORAL DAILY
Status: DISCONTINUED | OUTPATIENT
Start: 2023-12-27 | End: 2023-12-28 | Stop reason: HOSPADM

## 2023-12-26 RX ORDER — HYDROMORPHONE HYDROCHLORIDE 1 MG/ML
0.5 INJECTION, SOLUTION INTRAMUSCULAR; INTRAVENOUS; SUBCUTANEOUS ONCE
Status: COMPLETED | OUTPATIENT
Start: 2023-12-26 | End: 2023-12-26

## 2023-12-26 RX ORDER — ATORVASTATIN CALCIUM 40 MG/1
40 TABLET, FILM COATED ORAL DAILY
Status: DISCONTINUED | OUTPATIENT
Start: 2023-12-27 | End: 2023-12-28 | Stop reason: HOSPADM

## 2023-12-26 RX ORDER — SODIUM CHLORIDE 0.9 % (FLUSH) 0.9 %
5-40 SYRINGE (ML) INJECTION EVERY 12 HOURS SCHEDULED
Status: DISCONTINUED | OUTPATIENT
Start: 2023-12-26 | End: 2023-12-28 | Stop reason: HOSPADM

## 2023-12-26 RX ORDER — KETAMINE HCL IN NACL, ISO-OSM 100MG/10ML
0.4 SYRINGE (ML) INJECTION ONCE
Status: COMPLETED | OUTPATIENT
Start: 2023-12-26 | End: 2023-12-26

## 2023-12-26 RX ORDER — OXYCODONE HYDROCHLORIDE 5 MG/1
5 TABLET ORAL EVERY 4 HOURS PRN
Status: DISCONTINUED | OUTPATIENT
Start: 2023-12-26 | End: 2023-12-28 | Stop reason: HOSPADM

## 2023-12-26 RX ORDER — AMPICILLIN TRIHYDRATE 250 MG
100 CAPSULE ORAL DAILY
Status: DISCONTINUED | OUTPATIENT
Start: 2023-12-27 | End: 2023-12-28 | Stop reason: HOSPADM

## 2023-12-26 RX ORDER — ACETAMINOPHEN 500 MG
500 TABLET ORAL EVERY 6 HOURS
Status: DISCONTINUED | OUTPATIENT
Start: 2023-12-26 | End: 2023-12-28 | Stop reason: HOSPADM

## 2023-12-26 RX ORDER — LISINOPRIL 5 MG/1
5 TABLET ORAL DAILY
Status: DISCONTINUED | OUTPATIENT
Start: 2023-12-27 | End: 2023-12-28 | Stop reason: HOSPADM

## 2023-12-26 RX ORDER — AMLODIPINE BESYLATE 5 MG/1
5 TABLET ORAL DAILY
Status: DISCONTINUED | OUTPATIENT
Start: 2023-12-27 | End: 2023-12-28 | Stop reason: HOSPADM

## 2023-12-26 RX ORDER — ISOSORBIDE MONONITRATE 30 MG/1
30 TABLET, EXTENDED RELEASE ORAL EVERY MORNING
Status: DISCONTINUED | OUTPATIENT
Start: 2023-12-27 | End: 2023-12-28 | Stop reason: HOSPADM

## 2023-12-26 RX ORDER — POTASSIUM CHLORIDE 20 MEQ/1
40 TABLET, EXTENDED RELEASE ORAL PRN
Status: DISCONTINUED | OUTPATIENT
Start: 2023-12-26 | End: 2023-12-28 | Stop reason: HOSPADM

## 2023-12-26 RX ORDER — OMEGA-3/DHA/EPA/FISH OIL 60 MG-90MG
500 CAPSULE ORAL DAILY
Status: DISCONTINUED | OUTPATIENT
Start: 2023-12-26 | End: 2023-12-26 | Stop reason: CLARIF

## 2023-12-26 RX ORDER — RANOLAZINE 500 MG/1
500 TABLET, EXTENDED RELEASE ORAL 2 TIMES DAILY
Status: DISCONTINUED | OUTPATIENT
Start: 2023-12-26 | End: 2023-12-28 | Stop reason: HOSPADM

## 2023-12-26 RX ORDER — SODIUM CHLORIDE 9 MG/ML
INJECTION, SOLUTION INTRAVENOUS CONTINUOUS
Status: DISCONTINUED | OUTPATIENT
Start: 2023-12-26 | End: 2023-12-28 | Stop reason: HOSPADM

## 2023-12-26 RX ORDER — MAGNESIUM SULFATE IN WATER 40 MG/ML
2000 INJECTION, SOLUTION INTRAVENOUS PRN
Status: DISCONTINUED | OUTPATIENT
Start: 2023-12-26 | End: 2023-12-28 | Stop reason: HOSPADM

## 2023-12-26 RX ORDER — SODIUM CHLORIDE 0.9 % (FLUSH) 0.9 %
5-40 SYRINGE (ML) INJECTION PRN
Status: DISCONTINUED | OUTPATIENT
Start: 2023-12-26 | End: 2023-12-28 | Stop reason: HOSPADM

## 2023-12-26 RX ORDER — ONDANSETRON 2 MG/ML
4 INJECTION INTRAMUSCULAR; INTRAVENOUS EVERY 6 HOURS PRN
Status: DISCONTINUED | OUTPATIENT
Start: 2023-12-26 | End: 2023-12-28 | Stop reason: HOSPADM

## 2023-12-26 RX ORDER — ATENOLOL 25 MG/1
50 TABLET ORAL DAILY
Status: DISCONTINUED | OUTPATIENT
Start: 2023-12-27 | End: 2023-12-28 | Stop reason: HOSPADM

## 2023-12-26 RX ORDER — ONDANSETRON 4 MG/1
4 TABLET, ORALLY DISINTEGRATING ORAL EVERY 8 HOURS PRN
Status: DISCONTINUED | OUTPATIENT
Start: 2023-12-26 | End: 2023-12-28 | Stop reason: HOSPADM

## 2023-12-26 RX ORDER — OXYCODONE HYDROCHLORIDE AND ACETAMINOPHEN 5; 325 MG/1; MG/1
1 TABLET ORAL
Status: COMPLETED | OUTPATIENT
Start: 2023-12-26 | End: 2023-12-26

## 2023-12-26 RX ORDER — POLYETHYLENE GLYCOL 3350 17 G/17G
17 POWDER, FOR SOLUTION ORAL DAILY PRN
Status: DISCONTINUED | OUTPATIENT
Start: 2023-12-26 | End: 2023-12-28 | Stop reason: HOSPADM

## 2023-12-26 RX ORDER — POTASSIUM CHLORIDE 7.45 MG/ML
10 INJECTION INTRAVENOUS PRN
Status: DISCONTINUED | OUTPATIENT
Start: 2023-12-26 | End: 2023-12-28 | Stop reason: HOSPADM

## 2023-12-26 RX ORDER — MORPHINE SULFATE 2 MG/ML
2 INJECTION, SOLUTION INTRAMUSCULAR; INTRAVENOUS
Status: DISCONTINUED | OUTPATIENT
Start: 2023-12-26 | End: 2023-12-28

## 2023-12-26 RX ORDER — UBIDECARENONE 100 MG
100 CAPSULE ORAL DAILY
COMMUNITY

## 2023-12-26 RX ORDER — 0.9 % SODIUM CHLORIDE 0.9 %
1000 INTRAVENOUS SOLUTION INTRAVENOUS ONCE
Status: COMPLETED | OUTPATIENT
Start: 2023-12-26 | End: 2023-12-26

## 2023-12-26 RX ORDER — OMEGA-3/DHA/EPA/FISH OIL 60 MG-90MG
500 CAPSULE ORAL DAILY
COMMUNITY

## 2023-12-26 RX ORDER — LIDOCAINE HYDROCHLORIDE 10 MG/ML
20 INJECTION, SOLUTION EPIDURAL; INFILTRATION; INTRACAUDAL; PERINEURAL ONCE
Status: COMPLETED | OUTPATIENT
Start: 2023-12-26 | End: 2023-12-26

## 2023-12-26 RX ORDER — FAMOTIDINE 20 MG/1
20 TABLET, FILM COATED ORAL 2 TIMES DAILY
Status: DISCONTINUED | OUTPATIENT
Start: 2023-12-26 | End: 2023-12-28 | Stop reason: HOSPADM

## 2023-12-26 RX ADMIN — SODIUM CHLORIDE 1000 ML: 9 INJECTION, SOLUTION INTRAVENOUS at 15:42

## 2023-12-26 RX ADMIN — HYDROMORPHONE HYDROCHLORIDE 0.5 MG: 1 INJECTION, SOLUTION INTRAMUSCULAR; INTRAVENOUS; SUBCUTANEOUS at 14:46

## 2023-12-26 RX ADMIN — FAMOTIDINE 20 MG: 20 TABLET ORAL at 22:44

## 2023-12-26 RX ADMIN — ACETAMINOPHEN 500 MG: 500 TABLET ORAL at 22:44

## 2023-12-26 RX ADMIN — Medication 27.2 MG: at 14:48

## 2023-12-26 RX ADMIN — SODIUM CHLORIDE: 9 INJECTION, SOLUTION INTRAVENOUS at 18:23

## 2023-12-26 RX ADMIN — OXYCODONE HYDROCHLORIDE 5 MG: 5 TABLET ORAL at 21:19

## 2023-12-26 RX ADMIN — RANOLAZINE 500 MG: 500 TABLET, EXTENDED RELEASE ORAL at 22:44

## 2023-12-26 RX ADMIN — OXYCODONE AND ACETAMINOPHEN 1 TABLET: 5; 325 TABLET ORAL at 13:30

## 2023-12-26 RX ADMIN — PROPOFOL 100 MG: 10 INJECTION, EMULSION INTRAVENOUS at 15:44

## 2023-12-26 RX ADMIN — ACETAMINOPHEN 500 MG: 500 TABLET ORAL at 18:19

## 2023-12-26 RX ADMIN — LIDOCAINE HYDROCHLORIDE 20 ML: 10 INJECTION, SOLUTION EPIDURAL; INFILTRATION; INTRACAUDAL; PERINEURAL at 14:19

## 2023-12-26 RX ADMIN — MORPHINE SULFATE 2 MG: 2 INJECTION, SOLUTION INTRAMUSCULAR; INTRAVENOUS at 19:14

## 2023-12-26 ASSESSMENT — PAIN DESCRIPTION - ORIENTATION
ORIENTATION: LEFT
ORIENTATION: RIGHT

## 2023-12-26 ASSESSMENT — PAIN DESCRIPTION - DESCRIPTORS: DESCRIPTORS: ACHING

## 2023-12-26 ASSESSMENT — PAIN SCALES - GENERAL
PAINLEVEL_OUTOF10: 5
PAINLEVEL_OUTOF10: 4
PAINLEVEL_OUTOF10: 4

## 2023-12-26 ASSESSMENT — PAIN - FUNCTIONAL ASSESSMENT
PAIN_FUNCTIONAL_ASSESSMENT: INTOLERABLE, UNABLE TO DO ANY ACTIVE OR PASSIVE ACTIVITIES
PAIN_FUNCTIONAL_ASSESSMENT: 0-10

## 2023-12-26 ASSESSMENT — PAIN DESCRIPTION - LOCATION
LOCATION: FOOT
LOCATION: ANKLE

## 2023-12-26 NOTE — PROGRESS NOTES
Herbal or dietary supplement products    [address] [place] [address] does not stock herbal or dietary supplement products. The use of such is strongly discouraged due to the lack of regulated consistency of products. These products do not meet FDA or USP standards.     I removed fish oil from the patient's MAR    Thanks  Emmit Kocher, [facility]

## 2023-12-26 NOTE — H&P
H&P    Subjective:     Date of Consultation:  2023      Karen Ace is a 70 y.o. male who is being seen for left ankle fracture/ dislocation Pt reports injury occurred this morning, describes trip/fall. Accompanied by Wife. Retired,  Ambulates at baseline unassisted. Recent cervical fusion by Dr Emma Marinelli    Patient Active Problem List    Diagnosis Date Noted    Closed fracture of ankle, trimalleolar, left, initial encounter 2023    Cervical stenosis of spinal canal 10/25/2023     Family History   Problem Relation Age of Onset    Heart Disease Mother     Arthritis Mother     Heart Disease Father     Heart Attack Father          AT 68    Cancer Brother         TESTICULAR    No Known Problems Brother     Anesth Problems Neg Hx       Social History     Tobacco Use    Smoking status: Some Days     Current packs/day: 0.50     Average packs/day: 0.5 packs/day for 30.0 years (15.0 ttl pk-yrs)     Types: Cigarettes    Smokeless tobacco: Never    Tobacco comments:     3-4 CIGARETTES PER DAY   Substance Use Topics    Alcohol use: Yes     Alcohol/week: 12.0 standard drinks of alcohol     Types: 12 Cans of beer per week     Past Medical History:   Diagnosis Date    Arthritis     CAD (coronary artery disease)     Hypertension       Past Surgical History:   Procedure Laterality Date    BACK SURGERY      LUMBAR LAMINECTOMY    CARDIAC CATHETERIZATION      x5 stents    CERVICAL FUSION N/A 10/25/2023    ANTERIOR CERVICAL DISCECTOMY AND FUSION C4-5 AND C5-6 performed by Moe Garcia MD at [facility] Left     FEMUR BROKE AND REPAIRED AS A CHILD    TONSILLECTOMY        Prior to Admission medications    Medication Sig Start Date End Date Taking?  Authorizing Provider   Coenzyme Q10 (CO Q10) 200 MG CAPS Take 1 tablet by mouth daily    Karely Saucedo MD   KRILL OIL PO Take 500 mg by mouth daily    Karely Saucedo MD   aspirin 81 MG EC tablet Take 1

## 2023-12-26 NOTE — ED PROVIDER NOTES
[facility] 1 ORTHOPEDICS  EMERGENCY DEPARTMENT ENCOUNTER       Pt Name: Donell Alston  MRN: [id number]  Birthdate 1955  Date of evaluation: 12/26/2023  Provider: Kyle Carbone MD   PCP: Jesse Morales MD  Note Started: 1:11 PM EST 12/26/23     CHIEF COMPLAINT       Chief Complaint   Patient presents with    Ankle Injury     Pt states he tripped and fell and injured L ankle. Pt concerned ankle is broken, pt PVS is intact. Pt denies head injury or LOC.         HISTORY OF PRESENT ILLNESS: 1 or more elements      History From: Patient  HPI Limitations: None     Donell Alston is a 68 y.o. male who presents for left ankle injury.  he reports that he tripped and fell into a hole this morning.  He reports he has swelling, pain, bruising to his left ankle.  No medications taken prior to arrival.  Denies head injury.  Denies syncope.  Denies pain elsewhere.  No bleeding or open wounds.  Denies hip pain, knee pain.       Nursing Notes were all reviewed and agreed with or any disagreements were addressed in the HPI.     REVIEW OF SYSTEMS      Review of Systems     Positives and Pertinent negatives as per HPI.    PAST HISTORY     Past Medical History:  Past Medical History:   Diagnosis Date    Arthritis     CAD (coronary artery disease)     Hypertension          Past Surgical History:  Past Surgical History:   Procedure Laterality Date    ANKLE FRACTURE SURGERY Left 12/27/2023    LEFT ANKLE OPEN REDUCTION INTERNAL FIXATION performed by Haresh Farnsworth MD at [facility] MAIN OR    BACK SURGERY  2020    LUMBAR LAMINECTOMY    CARDIAC CATHETERIZATION      x5 stents    CERVICAL FUSION N/A 10/25/2023    ANTERIOR CERVICAL DISCECTOMY AND FUSION C4-5 AND C5-6 performed by Leonard Castillo MD at [facility] MAIN OR    COLONOSCOPY      ORTHOPEDIC SURGERY Left     FEMUR BROKE AND REPAIRED AS A CHILD    TONSILLECTOMY         Family History:  Family History   Problem Relation Age of Onset    Heart Disease Mother     Arthritis Mother     Heart  fracture of ankle, closed, left, initial encounter    2. Closed fracture of ankle, trimalleolar, left, initial encounter          DISPOSITION/PLAN   DISPOSITION Admitted 12/27/2023 04:35:11 PM      Admit Note: Pt is being admitted by Orthopedics. The results of their tests and reason(s) for their admission have been discussed with pt and/or available family. They convey agreement and understanding for the need to be admitted and for the admission diagnosis. I am the Primary Clinician of Record. Светлана Garcia MD (electronically signed)      (Please note that parts of this dictation were completed with voice recognition software. Quite often unanticipated grammatical, syntax, homophones, and other interpretive errors are inadvertently transcribed by the computer software. Please disregards these errors.  Please excuse any errors that have escaped final proofreading.)         Светлана Garcia MD  12/29/23 8221

## 2023-12-26 NOTE — PROGRESS NOTES
Pharmacy Medication Reconciliation     The patient was interviewed regarding current PTA medication list, use and drug allergies;  patient present in room and obtained permission from patient to discuss drug regimen with visitor(s) present. The patient was questioned regarding use of any other inhalers, topical products, over the counter medications, herbal medications, vitamin products or ophthalmic/nasal/otic medication use. Allergy Update: Patient has no known allergies. Recommendations/Findings: The following amendments were made to the patient's active medication list on file at [facility]:   PRospective    Clarified PTA med list with rx query, patient interview.  PTA medication list was corrected to the following:     Prior to Admission Medications   Prescriptions Last Dose Informant   Omega-3 Fatty Acids (FISH OIL) 500 MG CAPS 12/26/2023 Self   Sig: Take 500 mg by mouth daily   amLODIPine (NORVASC) 5 MG tablet 12/26/2023 Self   Sig: Take 1 tablet by mouth daily   aspirin 81 MG EC tablet 12/26/2023 Self   Sig: Take 1 tablet by mouth daily   atenolol (TENORMIN) 50 MG tablet 12/26/2023 Self   Sig: Take 1 tablet by mouth daily   atorvastatin (LIPITOR) 40 MG tablet 12/26/2023 Self   Sig: Take 1 tablet by mouth daily   coenzyme Q10 100 MG CAPS capsule 12/26/2023 Self   Sig: Take 1 capsule by mouth daily   isosorbide mononitrate (IMDUR) 30 MG extended release tablet 12/26/2023 Self   Sig: Take 1 tablet by mouth every morning   lisinopril (PRINIVIL;ZESTRIL) 5 MG tablet 12/26/2023 Self   Sig: Take 1 tablet by mouth daily   ranolazine (RANEXA) 500 MG extended release tablet 12/26/2023 Self   Sig: Take 1 tablet by mouth 2 times daily      Facility-Administered Medications: None        Thank you,  Philip Ramos, [facility]

## 2023-12-26 NOTE — ED NOTES
Ortho at bedside for Left ankle closed reduction. ROLA Urban obtained informed consent for procedure. Pt and family verbalized understanding.

## 2023-12-27 ENCOUNTER — APPOINTMENT (OUTPATIENT)
Facility: HOSPITAL | Age: 68
End: 2023-12-27
Payer: MEDICARE

## 2023-12-27 ENCOUNTER — ANESTHESIA (OUTPATIENT)
Facility: HOSPITAL | Age: 68
End: 2023-12-27
Payer: MEDICARE

## 2023-12-27 ENCOUNTER — ANESTHESIA EVENT (OUTPATIENT)
Facility: HOSPITAL | Age: 68
End: 2023-12-27
Payer: MEDICARE

## 2023-12-27 LAB
ANION GAP SERPL CALC-SCNC: 5 MMOL/L (ref 5–15)
BASOPHILS # BLD: 0.1 K/UL (ref 0–0.1)
BASOPHILS NFR BLD: 1 % (ref 0–1)
BUN SERPL-MCNC: 6 MG/DL (ref 6–20)
BUN/CREAT SERPL: 10 (ref 12–20)
CALCIUM SERPL-MCNC: 9.1 MG/DL (ref 8.5–10.1)
CHLORIDE SERPL-SCNC: 110 MMOL/L (ref 97–108)
CO2 SERPL-SCNC: 25 MMOL/L (ref 21–32)
CREAT SERPL-MCNC: 0.59 MG/DL (ref 0.7–1.3)
DIFFERENTIAL METHOD BLD: ABNORMAL
EKG ATRIAL RATE: 63 BPM
EKG DIAGNOSIS: NORMAL
EKG P AXIS: 60 DEGREES
EKG P-R INTERVAL: 172 MS
EKG Q-T INTERVAL: 468 MS
EKG QRS DURATION: 98 MS
EKG QTC CALCULATION (BAZETT): 478 MS
EKG R AXIS: 61 DEGREES
EKG T AXIS: 49 DEGREES
EKG VENTRICULAR RATE: 63 BPM
EOSINOPHIL # BLD: 0.2 K/UL (ref 0–0.4)
EOSINOPHIL NFR BLD: 3 % (ref 0–7)
ERYTHROCYTE [DISTWIDTH] IN BLOOD BY AUTOMATED COUNT: 13.1 % (ref 11.5–14.5)
GLUCOSE BLD STRIP.AUTO-MCNC: 93 MG/DL (ref 65–117)
GLUCOSE SERPL-MCNC: 89 MG/DL (ref 65–100)
HCT VFR BLD AUTO: 35.2 % (ref 36.6–50.3)
HGB BLD-MCNC: 12.1 G/DL (ref 12.1–17)
IMM GRANULOCYTES # BLD AUTO: 0 K/UL (ref 0–0.04)
IMM GRANULOCYTES NFR BLD AUTO: 0 % (ref 0–0.5)
LYMPHOCYTES # BLD: 2 K/UL (ref 0.8–3.5)
LYMPHOCYTES NFR BLD: 28 % (ref 12–49)
MAGNESIUM SERPL-MCNC: 2.1 MG/DL (ref 1.6–2.4)
MCH RBC QN AUTO: 33.1 PG (ref 26–34)
MCHC RBC AUTO-ENTMCNC: 34.4 G/DL (ref 30–36.5)
MCV RBC AUTO: 96.2 FL (ref 80–99)
MONOCYTES # BLD: 0.8 K/UL (ref 0–1)
MONOCYTES NFR BLD: 12 % (ref 5–13)
NEUTS SEG # BLD: 3.9 K/UL (ref 1.8–8)
NEUTS SEG NFR BLD: 56 % (ref 32–75)
NRBC # BLD: 0 K/UL (ref 0–0.01)
NRBC BLD-RTO: 0 PER 100 WBC
PLATELET # BLD AUTO: 252 K/UL (ref 150–400)
PMV BLD AUTO: 9 FL (ref 8.9–12.9)
POTASSIUM SERPL-SCNC: 3.4 MMOL/L (ref 3.5–5.1)
RBC # BLD AUTO: 3.66 M/UL (ref 4.1–5.7)
SERVICE CMNT-IMP: NORMAL
SODIUM SERPL-SCNC: 140 MMOL/L (ref 136–145)
WBC # BLD AUTO: 7 K/UL (ref 4.1–11.1)

## 2023-12-27 PROCEDURE — 3700000000 HC ANESTHESIA ATTENDED CARE: Performed by: ORTHOPAEDIC SURGERY

## 2023-12-27 PROCEDURE — C1713 ANCHOR/SCREW BN/BN,TIS/BN: HCPCS | Performed by: ORTHOPAEDIC SURGERY

## 2023-12-27 PROCEDURE — 85025 COMPLETE CBC W/AUTO DIFF WBC: CPT

## 2023-12-27 PROCEDURE — 6370000000 HC RX 637 (ALT 250 FOR IP): Performed by: ORTHOPAEDIC SURGERY

## 2023-12-27 PROCEDURE — 3700000001 HC ADD 15 MINUTES (ANESTHESIA): Performed by: ORTHOPAEDIC SURGERY

## 2023-12-27 PROCEDURE — 6360000002 HC RX W HCPCS: Performed by: ORTHOPAEDIC SURGERY

## 2023-12-27 PROCEDURE — G0378 HOSPITAL OBSERVATION PER HR: HCPCS

## 2023-12-27 PROCEDURE — 6370000000 HC RX 637 (ALT 250 FOR IP): Performed by: PHYSICIAN ASSISTANT

## 2023-12-27 PROCEDURE — 3600000014 HC SURGERY LEVEL 4 ADDTL 15MIN: Performed by: ORTHOPAEDIC SURGERY

## 2023-12-27 PROCEDURE — 2580000003 HC RX 258: Performed by: REGISTERED NURSE

## 2023-12-27 PROCEDURE — 2720000010 HC SURG SUPPLY STERILE: Performed by: ORTHOPAEDIC SURGERY

## 2023-12-27 PROCEDURE — 80048 BASIC METABOLIC PNL TOTAL CA: CPT

## 2023-12-27 PROCEDURE — 64447 NJX AA&/STRD FEMORAL NRV IMG: CPT | Performed by: STUDENT IN AN ORGANIZED HEALTH CARE EDUCATION/TRAINING PROGRAM

## 2023-12-27 PROCEDURE — 2500000003 HC RX 250 WO HCPCS: Performed by: REGISTERED NURSE

## 2023-12-27 PROCEDURE — 96376 TX/PRO/DX INJ SAME DRUG ADON: CPT

## 2023-12-27 PROCEDURE — 2580000003 HC RX 258: Performed by: ORTHOPAEDIC SURGERY

## 2023-12-27 PROCEDURE — 7100000000 HC PACU RECOVERY - FIRST 15 MIN: Performed by: ORTHOPAEDIC SURGERY

## 2023-12-27 PROCEDURE — 6360000002 HC RX W HCPCS: Performed by: STUDENT IN AN ORGANIZED HEALTH CARE EDUCATION/TRAINING PROGRAM

## 2023-12-27 PROCEDURE — 6360000002 HC RX W HCPCS: Performed by: PHYSICIAN ASSISTANT

## 2023-12-27 PROCEDURE — 3600000004 HC SURGERY LEVEL 4 BASE: Performed by: ORTHOPAEDIC SURGERY

## 2023-12-27 PROCEDURE — 2709999900 HC NON-CHARGEABLE SUPPLY: Performed by: ORTHOPAEDIC SURGERY

## 2023-12-27 PROCEDURE — 27840 TREAT ANKLE DISLOCATION: CPT | Performed by: PHYSICIAN ASSISTANT

## 2023-12-27 PROCEDURE — 82962 GLUCOSE BLOOD TEST: CPT

## 2023-12-27 PROCEDURE — 6360000002 HC RX W HCPCS: Performed by: REGISTERED NURSE

## 2023-12-27 PROCEDURE — 36415 COLL VENOUS BLD VENIPUNCTURE: CPT

## 2023-12-27 PROCEDURE — 2580000003 HC RX 258: Performed by: PHYSICIAN ASSISTANT

## 2023-12-27 PROCEDURE — 64445 NJX AA&/STRD SCIATIC NRV IMG: CPT | Performed by: STUDENT IN AN ORGANIZED HEALTH CARE EDUCATION/TRAINING PROGRAM

## 2023-12-27 PROCEDURE — 83735 ASSAY OF MAGNESIUM: CPT

## 2023-12-27 PROCEDURE — 7100000001 HC PACU RECOVERY - ADDTL 15 MIN: Performed by: ORTHOPAEDIC SURGERY

## 2023-12-27 PROCEDURE — C1769 GUIDE WIRE: HCPCS | Performed by: ORTHOPAEDIC SURGERY

## 2023-12-27 DEVICE — BONE SCREW
Type: IMPLANTABLE DEVICE | Site: ANKLE | Status: FUNCTIONAL
Brand: VARIAX

## 2023-12-27 DEVICE — LOCKING SCREW
Type: IMPLANTABLE DEVICE | Site: ANKLE | Status: FUNCTIONAL
Brand: VARIAX

## 2023-12-27 DEVICE — DISTAL LATERAL FIBULA PLATE, 6 HOLE
Type: IMPLANTABLE DEVICE | Site: ANKLE | Status: FUNCTIONAL
Brand: VARIAX

## 2023-12-27 RX ORDER — PHENYLEPHRINE HCL IN 0.9% NACL 0.4MG/10ML
SYRINGE (ML) INTRAVENOUS PRN
Status: DISCONTINUED | OUTPATIENT
Start: 2023-12-27 | End: 2023-12-27 | Stop reason: SDUPTHER

## 2023-12-27 RX ORDER — DOCUSATE SODIUM 100 MG/1
100 CAPSULE, LIQUID FILLED ORAL 2 TIMES DAILY
Qty: 60 CAPSULE | Refills: 0 | Status: SHIPPED | OUTPATIENT
Start: 2023-12-27 | End: 2024-01-26

## 2023-12-27 RX ORDER — SODIUM CHLORIDE, SODIUM LACTATE, POTASSIUM CHLORIDE, CALCIUM CHLORIDE 600; 310; 30; 20 MG/100ML; MG/100ML; MG/100ML; MG/100ML
INJECTION, SOLUTION INTRAVENOUS CONTINUOUS
Status: DISCONTINUED | OUTPATIENT
Start: 2023-12-27 | End: 2023-12-27

## 2023-12-27 RX ORDER — FENTANYL CITRATE 50 UG/ML
INJECTION, SOLUTION INTRAMUSCULAR; INTRAVENOUS PRN
Status: DISCONTINUED | OUTPATIENT
Start: 2023-12-27 | End: 2023-12-27 | Stop reason: SDUPTHER

## 2023-12-27 RX ORDER — GLYCOPYRROLATE 0.2 MG/ML
INJECTION INTRAMUSCULAR; INTRAVENOUS PRN
Status: DISCONTINUED | OUTPATIENT
Start: 2023-12-27 | End: 2023-12-27 | Stop reason: SDUPTHER

## 2023-12-27 RX ORDER — SODIUM CHLORIDE 0.9 % (FLUSH) 0.9 %
5-40 SYRINGE (ML) INJECTION EVERY 12 HOURS SCHEDULED
Status: DISCONTINUED | OUTPATIENT
Start: 2023-12-27 | End: 2023-12-27

## 2023-12-27 RX ORDER — LIDOCAINE HYDROCHLORIDE 20 MG/ML
INJECTION, SOLUTION EPIDURAL; INFILTRATION; INTRACAUDAL; PERINEURAL PRN
Status: DISCONTINUED | OUTPATIENT
Start: 2023-12-27 | End: 2023-12-27 | Stop reason: SDUPTHER

## 2023-12-27 RX ORDER — OXYCODONE HYDROCHLORIDE 5 MG/1
5 TABLET ORAL
Status: DISCONTINUED | OUTPATIENT
Start: 2023-12-27 | End: 2023-12-27

## 2023-12-27 RX ORDER — PROCHLORPERAZINE EDISYLATE 5 MG/ML
5 INJECTION INTRAMUSCULAR; INTRAVENOUS
Status: DISCONTINUED | OUTPATIENT
Start: 2023-12-27 | End: 2023-12-27

## 2023-12-27 RX ORDER — OXYCODONE HYDROCHLORIDE 5 MG/1
5 TABLET ORAL EVERY 4 HOURS PRN
Qty: 40 TABLET | Refills: 0 | Status: SHIPPED | OUTPATIENT
Start: 2023-12-27 | End: 2024-01-03

## 2023-12-27 RX ORDER — SODIUM CHLORIDE 0.9 % (FLUSH) 0.9 %
5-40 SYRINGE (ML) INJECTION PRN
Status: DISCONTINUED | OUTPATIENT
Start: 2023-12-27 | End: 2023-12-27

## 2023-12-27 RX ORDER — ONDANSETRON 2 MG/ML
4 INJECTION INTRAMUSCULAR; INTRAVENOUS
Status: DISCONTINUED | OUTPATIENT
Start: 2023-12-27 | End: 2023-12-27

## 2023-12-27 RX ORDER — CEFAZOLIN SODIUM/WATER 2 G/20 ML
SYRINGE (ML) INTRAVENOUS PRN
Status: DISCONTINUED | OUTPATIENT
Start: 2023-12-27 | End: 2023-12-27 | Stop reason: SDUPTHER

## 2023-12-27 RX ORDER — SODIUM CHLORIDE 9 MG/ML
INJECTION, SOLUTION INTRAVENOUS PRN
Status: DISCONTINUED | OUTPATIENT
Start: 2023-12-27 | End: 2023-12-27

## 2023-12-27 RX ORDER — ROPIVACAINE HYDROCHLORIDE 5 MG/ML
INJECTION, SOLUTION EPIDURAL; INFILTRATION; PERINEURAL
Status: COMPLETED | OUTPATIENT
Start: 2023-12-27 | End: 2023-12-27

## 2023-12-27 RX ORDER — ONDANSETRON 2 MG/ML
INJECTION INTRAMUSCULAR; INTRAVENOUS PRN
Status: DISCONTINUED | OUTPATIENT
Start: 2023-12-27 | End: 2023-12-27 | Stop reason: SDUPTHER

## 2023-12-27 RX ORDER — ACETAMINOPHEN 500 MG
1000 TABLET ORAL 3 TIMES DAILY PRN
Qty: 180 TABLET | Refills: 0 | Status: SHIPPED | OUTPATIENT
Start: 2023-12-27

## 2023-12-27 RX ORDER — KETOROLAC TROMETHAMINE 10 MG/1
10 TABLET, FILM COATED ORAL EVERY 6 HOURS PRN
Qty: 20 TABLET | Refills: 0 | Status: SHIPPED | OUTPATIENT
Start: 2023-12-27 | End: 2024-12-26

## 2023-12-27 RX ORDER — DEXAMETHASONE SODIUM PHOSPHATE 4 MG/ML
INJECTION, SOLUTION INTRA-ARTICULAR; INTRALESIONAL; INTRAMUSCULAR; INTRAVENOUS; SOFT TISSUE PRN
Status: DISCONTINUED | OUTPATIENT
Start: 2023-12-27 | End: 2023-12-27 | Stop reason: SDUPTHER

## 2023-12-27 RX ORDER — FENTANYL CITRATE 50 UG/ML
25 INJECTION, SOLUTION INTRAMUSCULAR; INTRAVENOUS EVERY 5 MIN PRN
Status: DISCONTINUED | OUTPATIENT
Start: 2023-12-27 | End: 2023-12-27

## 2023-12-27 RX ORDER — EPHEDRINE SULFATE/0.9% NACL/PF 50 MG/5 ML
SYRINGE (ML) INTRAVENOUS PRN
Status: DISCONTINUED | OUTPATIENT
Start: 2023-12-27 | End: 2023-12-27 | Stop reason: SDUPTHER

## 2023-12-27 RX ORDER — MIDAZOLAM HYDROCHLORIDE 1 MG/ML
INJECTION INTRAMUSCULAR; INTRAVENOUS PRN
Status: DISCONTINUED | OUTPATIENT
Start: 2023-12-27 | End: 2023-12-27

## 2023-12-27 RX ORDER — MIDAZOLAM HYDROCHLORIDE 1 MG/ML
INJECTION INTRAMUSCULAR; INTRAVENOUS
Status: COMPLETED | OUTPATIENT
Start: 2023-12-27 | End: 2023-12-27

## 2023-12-27 RX ORDER — HYDROMORPHONE HYDROCHLORIDE 1 MG/ML
0.5 INJECTION, SOLUTION INTRAMUSCULAR; INTRAVENOUS; SUBCUTANEOUS EVERY 5 MIN PRN
Status: DISCONTINUED | OUTPATIENT
Start: 2023-12-27 | End: 2023-12-27

## 2023-12-27 RX ORDER — SODIUM CHLORIDE, SODIUM LACTATE, POTASSIUM CHLORIDE, CALCIUM CHLORIDE 600; 310; 30; 20 MG/100ML; MG/100ML; MG/100ML; MG/100ML
INJECTION, SOLUTION INTRAVENOUS CONTINUOUS PRN
Status: DISCONTINUED | OUTPATIENT
Start: 2023-12-27 | End: 2023-12-27 | Stop reason: SDUPTHER

## 2023-12-27 RX ADMIN — AMLODIPINE BESYLATE 5 MG: 5 TABLET ORAL at 11:55

## 2023-12-27 RX ADMIN — ROPIVACAINE HYDROCHLORIDE 15 ML: 5 INJECTION, SOLUTION EPIDURAL; INFILTRATION; PERINEURAL at 13:40

## 2023-12-27 RX ADMIN — MORPHINE SULFATE 2 MG: 2 INJECTION, SOLUTION INTRAMUSCULAR; INTRAVENOUS at 06:34

## 2023-12-27 RX ADMIN — MORPHINE SULFATE 2 MG: 2 INJECTION, SOLUTION INTRAMUSCULAR; INTRAVENOUS at 02:09

## 2023-12-27 RX ADMIN — DEXAMETHASONE SODIUM PHOSPHATE 8 MG: 4 INJECTION INTRA-ARTICULAR; INTRALESIONAL; INTRAMUSCULAR; INTRAVENOUS; SOFT TISSUE at 14:22

## 2023-12-27 RX ADMIN — FENTANYL CITRATE 25 MCG: 50 INJECTION, SOLUTION INTRAMUSCULAR; INTRAVENOUS at 14:48

## 2023-12-27 RX ADMIN — ASPIRIN 81 MG: 81 TABLET, COATED ORAL at 08:45

## 2023-12-27 RX ADMIN — SODIUM CHLORIDE, POTASSIUM CHLORIDE, SODIUM LACTATE AND CALCIUM CHLORIDE: 600; 310; 30; 20 INJECTION, SOLUTION INTRAVENOUS at 13:44

## 2023-12-27 RX ADMIN — GLYCOPYRROLATE 0.2 MG: 0.2 INJECTION, SOLUTION INTRAMUSCULAR; INTRAVENOUS at 14:05

## 2023-12-27 RX ADMIN — PROPOFOL 150 MG: 10 INJECTION, EMULSION INTRAVENOUS at 13:51

## 2023-12-27 RX ADMIN — LISINOPRIL 5 MG: 5 TABLET ORAL at 08:45

## 2023-12-27 RX ADMIN — Medication 2000 MG: at 13:44

## 2023-12-27 RX ADMIN — ONDANSETRON 4 MG: 2 INJECTION INTRAMUSCULAR; INTRAVENOUS at 14:22

## 2023-12-27 RX ADMIN — OXYCODONE HYDROCHLORIDE 5 MG: 5 TABLET ORAL at 08:50

## 2023-12-27 RX ADMIN — SODIUM CHLORIDE: 9 INJECTION, SOLUTION INTRAVENOUS at 08:51

## 2023-12-27 RX ADMIN — ATENOLOL 50 MG: 50 TABLET ORAL at 11:55

## 2023-12-27 RX ADMIN — ACETAMINOPHEN 500 MG: 500 TABLET ORAL at 04:57

## 2023-12-27 RX ADMIN — Medication 80 MCG: at 14:03

## 2023-12-27 RX ADMIN — FAMOTIDINE 20 MG: 20 TABLET ORAL at 20:45

## 2023-12-27 RX ADMIN — Medication 20 MG: at 14:19

## 2023-12-27 RX ADMIN — FENTANYL CITRATE 25 MCG: 50 INJECTION, SOLUTION INTRAMUSCULAR; INTRAVENOUS at 14:54

## 2023-12-27 RX ADMIN — ISOSORBIDE MONONITRATE 30 MG: 30 TABLET, EXTENDED RELEASE ORAL at 08:45

## 2023-12-27 RX ADMIN — SODIUM CHLORIDE, PRESERVATIVE FREE 10 ML: 5 INJECTION INTRAVENOUS at 20:46

## 2023-12-27 RX ADMIN — ACETAMINOPHEN 500 MG: 500 TABLET ORAL at 21:12

## 2023-12-27 RX ADMIN — WATER 2000 MG: 1 INJECTION INTRAMUSCULAR; INTRAVENOUS; SUBCUTANEOUS at 21:11

## 2023-12-27 RX ADMIN — LIDOCAINE HYDROCHLORIDE 40 MG: 20 INJECTION, SOLUTION EPIDURAL; INFILTRATION; INTRACAUDAL; PERINEURAL at 13:51

## 2023-12-27 RX ADMIN — RANOLAZINE 500 MG: 500 TABLET, EXTENDED RELEASE ORAL at 20:45

## 2023-12-27 RX ADMIN — MIDAZOLAM HYDROCHLORIDE 2 MG: 1 INJECTION, SOLUTION INTRAMUSCULAR; INTRAVENOUS at 13:31

## 2023-12-27 RX ADMIN — ATORVASTATIN CALCIUM 40 MG: 40 TABLET, FILM COATED ORAL at 08:45

## 2023-12-27 RX ADMIN — ROPIVACAINE HYDROCHLORIDE 25 ML: 5 INJECTION, SOLUTION EPIDURAL; INFILTRATION; PERINEURAL at 13:31

## 2023-12-27 RX ADMIN — FAMOTIDINE 20 MG: 20 TABLET ORAL at 08:45

## 2023-12-27 RX ADMIN — SODIUM CHLORIDE, PRESERVATIVE FREE 10 ML: 5 INJECTION INTRAVENOUS at 11:58

## 2023-12-27 ASSESSMENT — PAIN DESCRIPTION - ORIENTATION
ORIENTATION: LEFT
ORIENTATION: LEFT

## 2023-12-27 ASSESSMENT — PAIN DESCRIPTION - LOCATION
LOCATION: LEG
LOCATION: ANKLE

## 2023-12-27 ASSESSMENT — PAIN - FUNCTIONAL ASSESSMENT
PAIN_FUNCTIONAL_ASSESSMENT: 0-10
PAIN_FUNCTIONAL_ASSESSMENT: PREVENTS OR INTERFERES SOME ACTIVE ACTIVITIES AND ADLS

## 2023-12-27 ASSESSMENT — PAIN SCALES - GENERAL
PAINLEVEL_OUTOF10: 0
PAINLEVEL_OUTOF10: 2

## 2023-12-27 ASSESSMENT — PAIN DESCRIPTION - DESCRIPTORS: DESCRIPTORS: ACHING

## 2023-12-27 NOTE — FLOWSHEET NOTE
12/27/23 1525   Handoff   Communication Given Periop Handoff/Relief   Handoff phase Phase I receiving   Handoff Given To Keith Peters RN   Handoff Received From Kristy Puentes RN/[facility]   Handoff Communication Face to Face; At bedside   Time Handoff Given [phone]

## 2023-12-27 NOTE — ANESTHESIA PRE PROCEDURE
Department of Anesthesiology  Preprocedure Note       Name:  Ayaz Forrester   Age:  70 y.o.  :  1955                                          MRN:  [id number]         Date:  2023      Surgeon: Toro Landaverde):  Monty Santillan MD    Procedure: Procedure(s):  LEFT ANKLE OPEN REDUCTION INTERNAL FIXATION    Medications prior to admission:   Prior to Admission medications    Medication Sig Start Date End Date Taking?  Authorizing Provider   coenzyme Q10 100 MG CAPS capsule Take 1 capsule by mouth daily   Yes Karely Saucedo MD   Omega-3 Fatty Acids (FISH OIL) 500 MG CAPS Take 500 mg by mouth daily   Yes Karely Saucedo MD   aspirin 81 MG EC tablet Take 1 tablet by mouth daily    Karely Saucedo MD   amLODIPine (NORVASC) 5 MG tablet Take 1 tablet by mouth daily 23   Karely Saucedo MD   atenolol (TENORMIN) 50 MG tablet Take 1 tablet by mouth daily 23   Karely Saucedo MD   atorvastatin (LIPITOR) 40 MG tablet Take 1 tablet by mouth daily 23   Karely Saucedo MD   isosorbide mononitrate (IMDUR) 30 MG extended release tablet Take 1 tablet by mouth every morning 23   Karely Saucedo MD   lisinopril (PRINIVIL;ZESTRIL) 5 MG tablet Take 1 tablet by mouth daily 23   Karely Saucedo MD   ranolazine (RANEXA) 500 MG extended release tablet Take 1 tablet by mouth 2 times daily 23   Karely Saucedo MD       Current medications:    Current Facility-Administered Medications   Medication Dose Route Frequency Provider Last Rate Last Admin    sodium chloride flush 0.9 % injection 5-40 mL  5-40 mL IntraVENous 2 times per day ROLA Vidal-C        sodium chloride flush 0.9 % injection 5-40 mL  5-40 mL IntraVENous PRN Amairanijoaquim Dixon PA-C        0.9 % sodium chloride infusion   IntraVENous PRN Amairanijoaquim Dixon PA-C        potassium chloride (KLOR-CON M) extended release tablet 40 mEq  40 mEq Oral PRN ROLA Vidal-C        Or

## 2023-12-27 NOTE — ANESTHESIA POSTPROCEDURE EVALUATION
Department of Anesthesiology  Postprocedure Note    Patient: Laura Cha  MRN: [id number]  YOB: 1955  Date of evaluation: 12/27/2023    Procedure Summary       Date: 12/27/23 Room / Location: [facility] MAIN OR M4 / [facility] MAIN OR    Anesthesia Start: 1344 Anesthesia Stop: 7522    Procedure: LEFT ANKLE OPEN REDUCTION INTERNAL FIXATION (Left: Ankle) Diagnosis:       Closed fracture of left ankle, initial encounter      (Closed fracture of left ankle, initial encounter [G23.895B])    Providers: Mary Vera MD Responsible Provider: No Whtie MD    Anesthesia Type: general, regional ASA Status: 2            Anesthesia Type: No value filed. Ngozi Phase I: Ngozi Score: 10    Ngozi Phase II:      Anesthesia Post Evaluation    Patient location during evaluation: PACU  Patient participation: complete - patient participated  Level of consciousness: sleepy but conscious  Airway patency: patent  Nausea & Vomiting: no nausea and no vomiting  Cardiovascular status: hemodynamically stable  Respiratory status: acceptable and room air  Hydration status: stable  Multimodal analgesia pain management approach    No notable events documented.

## 2023-12-27 NOTE — OP NOTE
OPERATIVE REPORT    FACILITY: [facility]    PATIENT NAME: Donell Alston     DATE OF OPERATION: 12/27/23    PREOPERATIVE DIAGNOSIS:  Left trimalleolar ankle fracture.    POSTOPERATIVE DIAGNOSIS:   Left trimalleolar ankle fracture.    SURGERIES PERFORMED:   Open reduction with internal fixation left trimalleolar ankle fracture without fixation of posterior malleolus    ATTENDING PHYSICIAN: Haresh Farnsworth MD    ASSISTANT: Natalia Nicholas    IMPLANTS:  [place] Variax 6 hole distal fibular locking plate, 3.5 mm medial mal screws x2    SPECIMENS:  none    OPERATIVE FINDINGS:  stable fixation    ANESTHESIA: general    FLUIDS: Please see anesthesia record    ESTIMATED BLOOD LOSS: minimal      TOURNIQUET TIME: 46 min at 250 mm Hg    INDICATIONS FOR PROCEDURE: This patient is a 68 y.o. male who presented to our institution with left ankle fracture dislocation. After a detailed discussion regarding the risks, benefits, and alternatives to surgery, informed consent was obtained. The patient presents now to the operating room for that operative procedure.     DETAILED DESCRIPTION OF PROCEDURE: The patient was identified in preoperative holding. The operative extremity was marked. The patient was then taken back to the operating room where the anesthetic of choice was administered. The patient was positioned on the operating room table taking care to pad all bony prominences. The operative site was prepped and draped in the usual sterile fashion. Appropriate antibiotics were administered and timeouts were performed in keeping with guidelines.    Following this, an Esmarch was used to exsanguinate the limb. The tourniquet was inflated. A lateral incision was made of the peroneal fascia and then the peroneal fascia was incised longitudinally off the posterior border of the fibula. The superficial peroneal nerve was identified and protected anteriorly. Following this, subperiosteal exposure was done around the fracture site only and the  fracture edges were cleaned. The fracture was opened up and soft tissue and periosteum was removed from inside the fracture site . There was significant comminution about the fracture so an interfrag technique could not be used. A  plate was then taken off the back table and then placed over the distal fibula and clamped to the bone. The plate was secured distally using locking screws. The plate was then used to pull length and pinned into place. Under both visual and fluoroscopic visualizations, the alignment was confirmed to be anatomic. The plate was the secured proximally with bicortical screws. Attention was then directed to the medial side. Radiographs were taken, showing displacement of the medial malleolar fracture, and there was a palpable step-off at the fracture. Following this, a longitudinal incision was made centered over the medial malleolus. The fracture edged were cleaned. The medial malleolus fracture was opened up and soft tissue and periosteum was removed from inside the fracture site . The saphenous vein and nerve were protected anteriorly the whole time. Once the fracture was cleaned of hematoma and debris, the ankle was then copiously irrigated. There was no significant damage to the tibiotalar joint, tibial cartilage, the plafond, or the talar dome on the visualized services. Posterior tibial tendon was found to be intact with no lacerations. Following this, a modified point-to-point clamp was utilized to make excellent reduction and clamping of the fracture. A 3.5 mm screw was then placed into the anterior aspect of the medial malleolus at the anterior colliculus under direct visualization with biplanar flouroscopy and angled from anterior to posterior. A second screw was placed posterior at the level of the posterior colliculus in the same fashion. Following this, the ankle was stressed under fluoro and found to be stable.   Excellent purchase was noted of all screws and final

## 2023-12-27 NOTE — ANESTHESIA PROCEDURE NOTES
Peripheral Block    Patient location during procedure: pre-op  Reason for block: post-op pain management and at surgeon's request  Start time: 12/27/2023 1:31 PM  End time: 12/27/2023 1:36 PM  Staffing  Performed: anesthesiologist   Anesthesiologist: Mckenna Mojica MD  Performed by: Mckenna Mojica MD  Authorized by: Mckenna Mojica MD    Preanesthetic Checklist  Completed: patient identified, IV checked, site marked, risks and benefits discussed, surgical/procedural consents, equipment checked, pre-op evaluation, timeout performed, anesthesia consent given, oxygen available, monitors applied/VS acknowledged and fire risk safety assessment completed and verbalized  Peripheral Block   Prep: ChloraPrep  Provider prep: mask and sterile gloves  Patient monitoring: cardiac monitor, continuous pulse ox, frequent blood pressure checks, IV access and oxygen  Block type: Sciatic  Popliteal  Laterality: left  Injection technique: single-shot  Guidance: ultrasound guided  Local infiltration: ropivacaine  Infiltration strength: 0.5 %  Local infiltration: ropivacaine    Needle   Needle type: insulated echogenic nerve stimulator needle   Needle gauge: 21 G  Needle localization: anatomical landmarks and ultrasound guidance  Needle length: 10 cm  Assessment   Injection assessment: negative aspiration for heme, no paresthesia on injection, local visualized surrounding nerve on ultrasound and no intravascular symptoms  Paresthesia pain: none  Slow fractionated injection: yes  Hemodynamics: stable  Real-time US image taken/store: yes  Outcomes: uncomplicated and patient tolerated procedure well    Medications Administered  midazolam (VERSED) injection 2 mg/2mL - IntraVENous   2 mg - 12/27/2023 1:31:00 PM  ropivacaine (NAROPIN) injection 0.5% - Perineural   25 mL - 12/27/2023 1:31:00 PM

## 2023-12-27 NOTE — ED NOTES
Received call from [address] [place] in OR requesting pt receive his amlodipine and atenolol

## 2023-12-27 NOTE — ANESTHESIA PROCEDURE NOTES
Peripheral Block    Patient location during procedure: pre-op  Reason for block: post-op pain management and at surgeon's request  Start time: 12/27/2023 1:36 PM  End time: 12/27/2023 1:41 PM  Staffing  Performed: anesthesiologist   Anesthesiologist: Mckenna Mojica MD  Performed by: Mckenna Mojica MD  Authorized by: Mckenna Mojica MD    Preanesthetic Checklist  Completed: patient identified, IV checked, site marked, risks and benefits discussed, surgical/procedural consents, equipment checked, pre-op evaluation, timeout performed, anesthesia consent given, oxygen available, monitors applied/VS acknowledged and fire risk safety assessment completed and verbalized  Peripheral Block   Patient position: supine  Prep: ChloraPrep  Provider prep: mask and sterile gloves  Patient monitoring: cardiac monitor, continuous pulse ox, frequent blood pressure checks, IV access and oxygen  Block type: Saphenous  Laterality: left  Injection technique: single-shot  Guidance: ultrasound guided  Local infiltration: lidocaine  Local infiltration: lidocaine    Needle   Needle type: insulated echogenic nerve stimulator needle   Needle gauge: 21 G  Needle localization: anatomical landmarks and ultrasound guidance  Needle length: 10 cm  Assessment   Injection assessment: negative aspiration for heme, no paresthesia on injection, local visualized surrounding nerve on ultrasound and no intravascular symptoms  Paresthesia pain: none  Slow fractionated injection: yes  Hemodynamics: stable  Real-time US image taken/store: yes  Outcomes: uncomplicated and patient tolerated procedure well    Medications Administered  ropivacaine (NAROPIN) injection 0.5% - Perineural   15 mL - 12/27/2023 1:40:00 PM

## 2023-12-27 NOTE — CONSULTS
Orthopedic Consult Note:    HPI:  Kelvin Grewal is an 70 y.o. male who presents with left ankle pain after ground level fall. Immediate pain and inability to bear weight. Brought to ER by EMS and imaging revealed ankle fracture dislocation. Ankle was reduced in the ER. Orthopedics was consulted for surgical management of this patient. Physical Exam:  A&O x 3  Focused exam of left lower extremity:  Splint in place  Able to flex/extend toes  SILT  Brisk cap refill    Imaging:  I have independently reviewed and interpreted x-rays of left ankle which demonstrate trimalleolar ankle fracture dislocation with subsequent reduction. A/P:  70 y.o. male with left trimalleolar ankle fracture. Diagnosis and imaging were reviewed. Risks of surgery were reviewed including pain, infection, bleeding, blood clots, nonunion, malunion, fracture, neurovascular injury, need for re-operation, and anesthesia complications including death. These were discussed with the patient in a shared decision-making process in understandable terms where the relative pros and cons were considered. After an informed discussion of risks, benefits, and alternatives, and understanding the risks involved, the patient decided to proceed with surgery. -Medical management and optimization per IMED  -NPO  -plan for OR today  -he will be NWB for 6 weeks.

## 2023-12-27 NOTE — ED NOTES
Report given to [facility] in [place]. Anticipating surgery around 2pm. Tech giving pt CHG wipes to use.  Will have family member collect all belongings

## 2023-12-27 NOTE — FLOWSHEET NOTE
12/27/23 1525   Handoff   Communication Given Periop Handoff/Relief   Handoff phase Phase I receiving   Handoff Given To Teresa Victor RN   Handoff Received From Kristy Puentes RN/Yovani Lewis CRNA   Handoff Communication Face to Face; At bedside   Time Handoff Given [phone]

## 2023-12-28 VITALS
WEIGHT: 150 LBS | OXYGEN SATURATION: 99 % | TEMPERATURE: 98.4 F | RESPIRATION RATE: 16 BRPM | HEART RATE: 69 BPM | SYSTOLIC BLOOD PRESSURE: 139 MMHG | BODY MASS INDEX: 21.47 KG/M2 | HEIGHT: 70 IN | DIASTOLIC BLOOD PRESSURE: 77 MMHG

## 2023-12-28 LAB
ANION GAP SERPL CALC-SCNC: 7 MMOL/L (ref 5–15)
BASOPHILS # BLD: 0 K/UL (ref 0–0.1)
BASOPHILS NFR BLD: 0 % (ref 0–1)
BUN SERPL-MCNC: 6 MG/DL (ref 6–20)
BUN/CREAT SERPL: 8 (ref 12–20)
CALCIUM SERPL-MCNC: 9.4 MG/DL (ref 8.5–10.1)
CHLORIDE SERPL-SCNC: 105 MMOL/L (ref 97–108)
CO2 SERPL-SCNC: 25 MMOL/L (ref 21–32)
CREAT SERPL-MCNC: 0.78 MG/DL (ref 0.7–1.3)
DIFFERENTIAL METHOD BLD: ABNORMAL
EOSINOPHIL # BLD: 0 K/UL (ref 0–0.4)
EOSINOPHIL NFR BLD: 0 % (ref 0–7)
ERYTHROCYTE [DISTWIDTH] IN BLOOD BY AUTOMATED COUNT: 12.9 % (ref 11.5–14.5)
GLUCOSE SERPL-MCNC: 199 MG/DL (ref 65–100)
HCT VFR BLD AUTO: 33.8 % (ref 36.6–50.3)
HGB BLD-MCNC: 11.5 G/DL (ref 12.1–17)
IMM GRANULOCYTES # BLD AUTO: 0 K/UL (ref 0–0.04)
IMM GRANULOCYTES NFR BLD AUTO: 0 % (ref 0–0.5)
LYMPHOCYTES # BLD: 0.9 K/UL (ref 0.8–3.5)
LYMPHOCYTES NFR BLD: 7 % (ref 12–49)
MCH RBC QN AUTO: 33.2 PG (ref 26–34)
MCHC RBC AUTO-ENTMCNC: 34 G/DL (ref 30–36.5)
MCV RBC AUTO: 97.7 FL (ref 80–99)
MONOCYTES # BLD: 0.6 K/UL (ref 0–1)
MONOCYTES NFR BLD: 4 % (ref 5–13)
NEUTS SEG # BLD: 11.2 K/UL (ref 1.8–8)
NEUTS SEG NFR BLD: 89 % (ref 32–75)
NRBC # BLD: 0 K/UL (ref 0–0.01)
NRBC BLD-RTO: 0 PER 100 WBC
PLATELET # BLD AUTO: 275 K/UL (ref 150–400)
PMV BLD AUTO: 9.3 FL (ref 8.9–12.9)
POTASSIUM SERPL-SCNC: 3.6 MMOL/L (ref 3.5–5.1)
RBC # BLD AUTO: 3.46 M/UL (ref 4.1–5.7)
SODIUM SERPL-SCNC: 137 MMOL/L (ref 136–145)
WBC # BLD AUTO: 12.7 K/UL (ref 4.1–11.1)

## 2023-12-28 PROCEDURE — 97165 OT EVAL LOW COMPLEX 30 MIN: CPT

## 2023-12-28 PROCEDURE — 2580000003 HC RX 258: Performed by: ORTHOPAEDIC SURGERY

## 2023-12-28 PROCEDURE — APPNB15 APP NON BILLABLE TIME 0-15 MINS: Performed by: PHYSICIAN ASSISTANT

## 2023-12-28 PROCEDURE — 97116 GAIT TRAINING THERAPY: CPT | Performed by: PHYSICAL THERAPIST

## 2023-12-28 PROCEDURE — 97161 PT EVAL LOW COMPLEX 20 MIN: CPT | Performed by: PHYSICAL THERAPIST

## 2023-12-28 PROCEDURE — 6370000000 HC RX 637 (ALT 250 FOR IP): Performed by: ORTHOPAEDIC SURGERY

## 2023-12-28 PROCEDURE — 36415 COLL VENOUS BLD VENIPUNCTURE: CPT

## 2023-12-28 PROCEDURE — 80048 BASIC METABOLIC PNL TOTAL CA: CPT

## 2023-12-28 PROCEDURE — G0378 HOSPITAL OBSERVATION PER HR: HCPCS

## 2023-12-28 PROCEDURE — 6360000002 HC RX W HCPCS: Performed by: ORTHOPAEDIC SURGERY

## 2023-12-28 PROCEDURE — 97535 SELF CARE MNGMENT TRAINING: CPT

## 2023-12-28 PROCEDURE — 85025 COMPLETE CBC W/AUTO DIFF WBC: CPT

## 2023-12-28 RX ORDER — ASPIRIN 81 MG/1
81 TABLET ORAL 2 TIMES DAILY
Qty: 30 TABLET | Refills: 3 | Status: SHIPPED | OUTPATIENT
Start: 2023-12-28

## 2023-12-28 RX ORDER — FAMOTIDINE 20 MG/1
20 TABLET, FILM COATED ORAL 2 TIMES DAILY
Qty: 60 TABLET | Refills: 3 | Status: SHIPPED | OUTPATIENT
Start: 2023-12-28

## 2023-12-28 RX ADMIN — ISOSORBIDE MONONITRATE 30 MG: 30 TABLET, EXTENDED RELEASE ORAL at 08:36

## 2023-12-28 RX ADMIN — POLYETHYLENE GLYCOL 3350 17 G: 17 POWDER, FOR SOLUTION ORAL at 08:35

## 2023-12-28 RX ADMIN — Medication 100 MG: at 08:36

## 2023-12-28 RX ADMIN — OXYCODONE HYDROCHLORIDE 5 MG: 5 TABLET ORAL at 10:27

## 2023-12-28 RX ADMIN — AMLODIPINE BESYLATE 5 MG: 5 TABLET ORAL at 08:36

## 2023-12-28 RX ADMIN — ACETAMINOPHEN 500 MG: 500 TABLET ORAL at 04:54

## 2023-12-28 RX ADMIN — SODIUM CHLORIDE, PRESERVATIVE FREE 10 ML: 5 INJECTION INTRAVENOUS at 08:36

## 2023-12-28 RX ADMIN — ATENOLOL 50 MG: 50 TABLET ORAL at 08:36

## 2023-12-28 RX ADMIN — WATER 2000 MG: 1 INJECTION INTRAMUSCULAR; INTRAVENOUS; SUBCUTANEOUS at 05:01

## 2023-12-28 RX ADMIN — RANOLAZINE 500 MG: 500 TABLET, EXTENDED RELEASE ORAL at 08:36

## 2023-12-28 RX ADMIN — FAMOTIDINE 20 MG: 20 TABLET ORAL at 08:36

## 2023-12-28 RX ADMIN — LISINOPRIL 5 MG: 5 TABLET ORAL at 08:36

## 2023-12-28 RX ADMIN — ASPIRIN 81 MG: 81 TABLET, COATED ORAL at 08:36

## 2023-12-28 RX ADMIN — ACETAMINOPHEN 500 MG: 500 TABLET ORAL at 10:27

## 2023-12-28 RX ADMIN — ATORVASTATIN CALCIUM 40 MG: 40 TABLET, FILM COATED ORAL at 08:36

## 2023-12-28 ASSESSMENT — PAIN DESCRIPTION - DESCRIPTORS: DESCRIPTORS: ACHING;SHARP;SORE

## 2023-12-28 ASSESSMENT — PAIN SCALES - GENERAL
PAINLEVEL_OUTOF10: 0
PAINLEVEL_OUTOF10: 5

## 2023-12-28 ASSESSMENT — PAIN DESCRIPTION - LOCATION: LOCATION: ANKLE

## 2023-12-28 ASSESSMENT — PAIN DESCRIPTION - ORIENTATION: ORIENTATION: LEFT

## 2023-12-28 NOTE — PROGRESS NOTES
End of Shift Note    Bedside shift change report given to Amanda Babcock RN (oncoming nurse) by Saima Sibley RN (offgoing nurse). Report included the following information SBAR and Kardex    Shift worked:  night     Shift summary and any significant changes:     Alert and oriented; voiding; pain management; for therapy today     Concerns for physician to address:       Zone phone for oncoming shift:          Activity:     Number times ambulated in hallways past shift: 0  Number of times OOB to chair past shift: 0    Cardiac:   Cardiac Monitoring: No           Access:  Current line(s): PIV     Genitourinary:   Urinary status: voiding    Respiratory:      Chronic home O2 use?: NO  Incentive spirometer at bedside: YES       GI:     Current diet:  ADULT DIET; Regular  Passing flatus: YES  Tolerating current diet: YES       Pain Management:   Patient states pain is manageable on current regimen: YES    Skin:     Interventions: float heels, increase time out of bed, and PT/OT consult    Patient Safety:  Fall Score:    Interventions: bed/chair alarm, assistive device (walker, cane.  etc), gripper socks, and pt to call before getting OOB       Length of Stay:  Expected LOS: 1  Actual LOS: 0      Saima Sibley RN

## 2023-12-28 NOTE — CARE COORDINATION
CM informed patient in need of RW. CM made room visit with patient who reported he does not want to wait for RW to be ordered by CM and would like to purchase a RW at pharmacy on the way home.        [facility] [address]

## 2023-12-28 NOTE — PLAN OF CARE
Problem: Occupational Therapy - Adult  Goal: By Discharge: Performs self-care activities at highest level of function for planned discharge setting.  See evaluation for individualized goals.  Description: FUNCTIONAL STATUS PRIOR TO ADMISSION:     ,  ,  ,  ,  ,  ,  ,  ,  ,  ,       HOME SUPPORT: Patient lived with spouse but did not require assistance, recent cervical spine nerve release with some LUE weakness and recent fall with resulting LUE rotator cuff tear limiting AROM.    Occupational Therapy Goals:  Initiated 12/28/2023  1.  Patient will perform upper body dressing and lower body dressing with Modified [place] within 7 day(s).  2.  Patient will perform toileting with Modified [place] within 7 day(s).  3.  Patient will perform toilet transfers with Supervision  within 7 day(s).  4.  Patient will perform all aspects of toileting with Supervision within 7 day(s).  5.  Patient will utilize energy conservation techniques during functional activities with verbal and visual cues within 7 day(s).    Outcome: Progressing   OCCUPATIONAL THERAPY EVALUATION    Patient: Donell Alston (68 y.o. male)  Date: 12/28/2023  Primary Diagnosis: Closed fracture of ankle, trimalleolar, left, initial encounter [S82.852A]  Trimalleolar fracture of ankle, closed, left, initial encounter [S82.852A]  Procedure(s) (LRB):  LEFT ANKLE OPEN REDUCTION INTERNAL FIXATION (Left) 1 Day Post-Op     Precautions:                    ASSESSMENT :  The patient is limited by decreased functional mobility, independence in ADLs, high-level IADLs, strength, activity tolerance, coordination, balance, increased pain levels. Pt s/p L ankle ORIF after GLF prompting admission. Pt with recent cervical surgery and L rotator cuff tear limiting LUE AROM.  Patient with good understanding and adherence to NWB LLE status with brief mobility in room and to restroom. Good tolerance for transfers with CGA from varying surface heights and safe transitions  extensive additional review of patient history:   Lives With: Spouse  Type of Home: House  Home Layout: Two level, Able to Live on Main level with bedroom/bathroom  Home Access: Stairs to enter with rails     Entrance Stairs - Rails: Both  Bathroom Shower/Tub: Walk-in shower (shower upstairs)           Home Equipment: Crutches           Hand Dominance: right     EXAMINATION OF PERFORMANCE DEFICITS:    Cognitive/Behavioral Status:  Orientation  Overall Orientation Status: Within Normal Limits  Orientation Level: Oriented X4  Cognition  Overall Cognitive Status: WNL    Skin: WNL    Edema: in distal LLE    Hearing:   Hearing  Hearing: Within functional limits    Vision/Perceptual:          Vision  Vision: Within Functional Limits  Perception  Overall Perceptual Status: WFL    Range of Motion:   AROM: Generally decreased, functional (LUE rotator cuff tear limiting AROM)  PROM: Within functional limits      Strength:  Strength: Generally decreased, functional      Coordination:  Coordination: Within functional limits            Tone & Sensation:   Tone: Normal  Sensation: Intact          Functional Mobility and Transfers for ADLs:    Bed Mobility:     Bed Mobility Training  Bed Mobility Training: No    Transfers:      Transfer Training  Transfer Training: Yes  Sit to Stand: Contact-guard assistance;Assist X1;Additional time; Adaptive equipment  Stand to Sit: Contact-guard assistance;Assist X1;Additional time; Adaptive equipment  Stand Pivot Transfers: Additional time; Adaptive equipment  Toilet Transfer: Contact-guard assistance;Assist X1;Additional time; Adaptive equipment                     Balance:   Standing: With support  Balance  Sitting: Intact  Standing: With support      ADL Assessment:          Feeding: Independent       Grooming: Setup;Supervision       UE Bathing: Setup;Supervision       Skin Care: Bath wipes; Chlorhexidine wipes    LE Bathing: Setup;Supervision       UE Dressing: Setup;Supervision       LE

## 2023-12-28 NOTE — PROGRESS NOTES
DISCHARGE NOTE FROM [facility]    Patient determined to be stable for discharge by attending provider. I have reviewed the discharge instructions with the patient and spouse. They verbalized understanding and all questions were answered to their satisfaction. No complaints or further questions were expressed. Medications sent to pharmacy. Appropriate educational materials and medication side effect teaching were provided. PIV were removed prior to discharge. Patient did not discharge with any line, wilcox, or drain.     Personal items and valuables accounted for at discharge by patient and/or family: Yes    Post-op patient: No    Anastasiia Irwin RN

## 2023-12-28 NOTE — PROGRESS NOTES
PHYSICAL THERAPY EVALUATION/DISCHARGE    Patient: Donell Alston (68 y.o. male)  Date: 12/28/2023  Primary Diagnosis: Closed fracture of ankle, trimalleolar, left, initial encounter [S82.943E]  Trimalleolar fracture of ankle, closed, left, initial encounter [S82.260S]  Procedure(s) (LRB):  LEFT ANKLE OPEN REDUCTION INTERNAL FIXATION (Left) 1 Day Post-Op   Precautions: Fall Risk, Weight Bearing   Left Lower Extremity Weight Bearing: Non Weight Bearing                ASSESSMENT AND RECOMMENDATIONS:  Based on the objective data below, the patient is slightly below his baseline but able to demo safe transfers and ambulation while maintaining L NWB.  Overall CGA for transfers.  Able to hop into bathroom with RW and CGA with no overt LOB but continued weakness in L UE from recent neck surgery.  Also used knee scooter for an additional 50 feet with good return demo.  Patient verbally reviewed stairs as he plans to \"bump up\" on his bottom and feels comfortable doing that.  Patient has met all PT goals and is safe to DC home with HH PT and RW.    Patient is cleared for discharge from PT standpoint:  YES [x]     NO []     Further skilled acute physical therapy is not indicated at this time.       PLAN :  Recommendation for discharge: (in order for the patient to meet his/her long term goals): Therapy 2 days/week in the home    Other factors to consider for discharge: high risk for falls and concern for safely navigating or managing the home environment    IF patient discharges home will need the following DME: rolling walker       SUBJECTIVE:   Patient stated “I was too weak to go home last night.”    OBJECTIVE DATA SUMMARY:     Past Medical History:   Diagnosis Date    Arthritis     CAD (coronary artery disease)     Hypertension      Past Surgical History:   Procedure Laterality Date    ANKLE FRACTURE SURGERY Left 12/27/2023    LEFT ANKLE OPEN REDUCTION INTERNAL FIXATION performed by Haresh Farnsworth MD at [facility] OR    [facility]  (ft): 15 Feet (then additional 50 on knee scooter)  Assistive Device: Gait belt;Walker, rolling (also tried knee scooter)  Base of Support: Narrowed; Center of gravity altered  Speed/Melissa: Slow;Pace decreased (< 100 feet/min)  Stance: Right increased  Gait Abnormalities: Antalgic;Decreased step clearance; Step to gait                           Pain Ratin/10   Pain Intervention(s):   nursing notified    Activity Tolerance:   Good    After treatment:   Patient left in no apparent distress sitting up in chair, Call bell within reach, and Caregiver / family present      COMMUNICATION/EDUCATION:   The patient's plan of care was discussed with: physical therapist, occupational therapist, and registered nurse         Thank you for this referral.  Aayush Werner, PT  Minutes: 22

## 2023-12-28 NOTE — PLAN OF CARE
Problem: Occupational Therapy - Adult  Goal: By Discharge: Performs self-care activities at highest level of function for planned discharge setting. See evaluation for individualized goals. Description: FUNCTIONAL STATUS PRIOR TO ADMISSION:     ,  ,  ,  ,  ,  ,  ,  ,  ,  ,       HOME SUPPORT: Patient lived with spouse but did not require assistance, recent cervical spine nerve release with some LUE weakness and recent fall with resulting LUE rotator cuff tear limiting AROM. Occupational Therapy Goals:  Initiated 12/28/2023  1. Patient will perform upper body dressing and lower body dressing with Modified [place] within 7 day(s). 2.  Patient will perform toileting with Modified [place] within 7 day(s). 3.  Patient will perform toilet transfers with Supervision  within 7 day(s). 4.  Patient will perform all aspects of toileting with Supervision within 7 day(s). 5.  Patient will utilize energy conservation techniques during functional activities with verbal and visual cues within 7 day(s).     12/28/2023 1001 by Patti Clayton OT  Outcome: Progressing     Problem: ABCDS Injury Assessment  Goal: Absence of physical injury  Outcome: Adequate for Discharge     Problem: Safety - Adult  Goal: Free from fall injury  12/28/2023 1130 by Susan Bryant RN  Outcome: Adequate for Discharge  Flowsheets (Taken 12/28/2023 6546)  Free From Fall Injury: Instruct family/caregiver on patient safety  12/27/2023 2150 by Temo Calzada RN  Outcome: Progressing     Problem: Pain  Goal: Verbalizes/displays adequate comfort level or baseline comfort level  12/28/2023 1130 by Susan Bryant RN  Outcome: Adequate for Discharge  12/27/2023 2150 by Temo Calzada RN  Outcome: Progressing

## 2025-01-22 ENCOUNTER — HOSPITAL ENCOUNTER (INPATIENT)
Facility: HOSPITAL | Age: 70
LOS: 4 days | Discharge: HOME HEALTH | DRG: 193 | End: 2025-01-27
Attending: EMERGENCY MEDICINE | Admitting: STUDENT IN AN ORGANIZED HEALTH CARE EDUCATION/TRAINING PROGRAM
Payer: MEDICARE

## 2025-01-22 ENCOUNTER — APPOINTMENT (OUTPATIENT)
Facility: HOSPITAL | Age: 70
DRG: 193 | End: 2025-01-22
Payer: MEDICARE

## 2025-01-22 DIAGNOSIS — J10.1 INFLUENZA A: ICD-10-CM

## 2025-01-22 DIAGNOSIS — R78.81 STAPHYLOCOCCUS AUREUS BACTEREMIA: ICD-10-CM

## 2025-01-22 DIAGNOSIS — B95.61 STAPHYLOCOCCUS AUREUS BACTEREMIA: ICD-10-CM

## 2025-01-22 DIAGNOSIS — R09.02 HYPOXEMIA: Primary | ICD-10-CM

## 2025-01-22 LAB
ALBUMIN SERPL-MCNC: 4 G/DL (ref 3.5–5)
ALBUMIN/GLOB SERPL: 0.9 (ref 1.1–2.2)
ALP SERPL-CCNC: 98 U/L (ref 45–117)
ALT SERPL-CCNC: 34 U/L (ref 12–78)
ANION GAP SERPL CALC-SCNC: 10 MMOL/L (ref 2–12)
AST SERPL-CCNC: 26 U/L (ref 15–37)
BASOPHILS # BLD: 0.03 K/UL (ref 0–0.1)
BASOPHILS NFR BLD: 0.5 % (ref 0–1)
BILIRUB SERPL-MCNC: 0.6 MG/DL (ref 0.2–1)
BUN SERPL-MCNC: 9 MG/DL (ref 6–20)
BUN/CREAT SERPL: 12 (ref 12–20)
CALCIUM SERPL-MCNC: 10 MG/DL (ref 8.5–10.1)
CHLORIDE SERPL-SCNC: 99 MMOL/L (ref 97–108)
CO2 SERPL-SCNC: 22 MMOL/L (ref 21–32)
CREAT SERPL-MCNC: 0.75 MG/DL (ref 0.7–1.3)
DIFFERENTIAL METHOD BLD: ABNORMAL
EOSINOPHIL # BLD: 0.12 K/UL (ref 0–0.4)
EOSINOPHIL NFR BLD: 2.1 % (ref 0–7)
ERYTHROCYTE [DISTWIDTH] IN BLOOD BY AUTOMATED COUNT: 12.2 % (ref 11.5–14.5)
FLUAV RNA SPEC QL NAA+PROBE: DETECTED
FLUBV RNA SPEC QL NAA+PROBE: NOT DETECTED
GLOBULIN SER CALC-MCNC: 4.5 G/DL (ref 2–4)
GLUCOSE SERPL-MCNC: 119 MG/DL (ref 65–100)
HCT VFR BLD AUTO: 40.3 % (ref 36.6–50.3)
HGB BLD-MCNC: 13.6 G/DL (ref 12.1–17)
IMM GRANULOCYTES # BLD AUTO: 0.02 K/UL (ref 0–0.04)
IMM GRANULOCYTES NFR BLD AUTO: 0.4 % (ref 0–0.5)
LACTATE BLD-SCNC: 1.77 MMOL/L (ref 0.4–2)
LYMPHOCYTES # BLD: 0.47 K/UL (ref 0.8–3.5)
LYMPHOCYTES NFR BLD: 8.4 % (ref 12–49)
MCH RBC QN AUTO: 32.7 PG (ref 26–34)
MCHC RBC AUTO-ENTMCNC: 33.7 G/DL (ref 30–36.5)
MCV RBC AUTO: 96.9 FL (ref 80–99)
MONOCYTES # BLD: 0.79 K/UL (ref 0–1)
MONOCYTES NFR BLD: 14.1 % (ref 5–13)
NEUTS SEG # BLD: 4.17 K/UL (ref 1.8–8)
NEUTS SEG NFR BLD: 74.5 % (ref 32–75)
NRBC # BLD: 0 K/UL (ref 0–0.01)
NRBC BLD-RTO: 0 PER 100 WBC
PLATELET # BLD AUTO: 297 K/UL (ref 150–400)
PMV BLD AUTO: 9.3 FL (ref 8.9–12.9)
POTASSIUM SERPL-SCNC: 4 MMOL/L (ref 3.5–5.1)
PROT SERPL-MCNC: 8.5 G/DL (ref 6.4–8.2)
RBC # BLD AUTO: 4.16 M/UL (ref 4.1–5.7)
RBC MORPH BLD: ABNORMAL
SARS-COV-2 RNA RESP QL NAA+PROBE: NOT DETECTED
SODIUM SERPL-SCNC: 131 MMOL/L (ref 136–145)
SOURCE: ABNORMAL
TROPONIN I SERPL HS-MCNC: 6 NG/L (ref 0–76)
WBC # BLD AUTO: 5.6 K/UL (ref 4.1–11.1)

## 2025-01-22 PROCEDURE — 99285 EMERGENCY DEPT VISIT HI MDM: CPT

## 2025-01-22 PROCEDURE — 93005 ELECTROCARDIOGRAM TRACING: CPT | Performed by: EMERGENCY MEDICINE

## 2025-01-22 PROCEDURE — 85025 COMPLETE CBC W/AUTO DIFF WBC: CPT

## 2025-01-22 PROCEDURE — 83605 ASSAY OF LACTIC ACID: CPT

## 2025-01-22 PROCEDURE — 71045 X-RAY EXAM CHEST 1 VIEW: CPT

## 2025-01-22 PROCEDURE — 87077 CULTURE AEROBIC IDENTIFY: CPT

## 2025-01-22 PROCEDURE — 6360000002 HC RX W HCPCS: Performed by: EMERGENCY MEDICINE

## 2025-01-22 PROCEDURE — 80053 COMPREHEN METABOLIC PANEL: CPT

## 2025-01-22 PROCEDURE — 84484 ASSAY OF TROPONIN QUANT: CPT

## 2025-01-22 PROCEDURE — 6370000000 HC RX 637 (ALT 250 FOR IP): Performed by: EMERGENCY MEDICINE

## 2025-01-22 PROCEDURE — 36415 COLL VENOUS BLD VENIPUNCTURE: CPT

## 2025-01-22 PROCEDURE — 96374 THER/PROPH/DIAG INJ IV PUSH: CPT

## 2025-01-22 PROCEDURE — 87040 BLOOD CULTURE FOR BACTERIA: CPT

## 2025-01-22 PROCEDURE — 87636 SARSCOV2 & INF A&B AMP PRB: CPT

## 2025-01-22 PROCEDURE — 84145 PROCALCITONIN (PCT): CPT

## 2025-01-22 RX ORDER — ONDANSETRON 2 MG/ML
4 INJECTION INTRAMUSCULAR; INTRAVENOUS ONCE
Status: COMPLETED | OUTPATIENT
Start: 2025-01-22 | End: 2025-01-22

## 2025-01-22 RX ORDER — OSELTAMIVIR PHOSPHATE 75 MG/1
75 CAPSULE ORAL
Status: COMPLETED | OUTPATIENT
Start: 2025-01-22 | End: 2025-01-22

## 2025-01-22 RX ADMIN — OSELTAMIVIR PHOSPHATE 75 MG: 75 CAPSULE ORAL at 23:14

## 2025-01-22 RX ADMIN — ONDANSETRON 4 MG: 2 INJECTION INTRAMUSCULAR; INTRAVENOUS at 23:49

## 2025-01-22 ASSESSMENT — PAIN - FUNCTIONAL ASSESSMENT: PAIN_FUNCTIONAL_ASSESSMENT: NONE - DENIES PAIN

## 2025-01-23 ENCOUNTER — APPOINTMENT (OUTPATIENT)
Facility: HOSPITAL | Age: 70
DRG: 193 | End: 2025-01-23
Payer: MEDICARE

## 2025-01-23 PROBLEM — J11.1 INFLUENZA: Status: ACTIVE | Noted: 2025-01-23

## 2025-01-23 LAB
APPEARANCE UR: CLEAR
BACTERIA URNS QL MICRO: NEGATIVE /HPF
BILIRUB UR QL: NEGATIVE
COLOR UR: ABNORMAL
EKG ATRIAL RATE: 71 BPM
EKG DIAGNOSIS: NORMAL
EKG P AXIS: 74 DEGREES
EKG P-R INTERVAL: 176 MS
EKG Q-T INTERVAL: 414 MS
EKG QRS DURATION: 90 MS
EKG QTC CALCULATION (BAZETT): 449 MS
EKG R AXIS: 57 DEGREES
EKG T AXIS: 62 DEGREES
EKG VENTRICULAR RATE: 71 BPM
EPITH CASTS URNS QL MICRO: ABNORMAL /LPF
GLUCOSE UR STRIP.AUTO-MCNC: NEGATIVE MG/DL
HGB UR QL STRIP: NEGATIVE
HYALINE CASTS URNS QL MICRO: ABNORMAL /LPF (ref 0–2)
KETONES UR QL STRIP.AUTO: ABNORMAL MG/DL
LEUKOCYTE ESTERASE UR QL STRIP.AUTO: NEGATIVE
NITRITE UR QL STRIP.AUTO: NEGATIVE
PH UR STRIP: 6 (ref 5–8)
PROCALCITONIN SERPL-MCNC: <0.05 NG/ML
PROT UR STRIP-MCNC: ABNORMAL MG/DL
RBC #/AREA URNS HPF: ABNORMAL /HPF (ref 0–5)
SP GR UR REFRACTOMETRY: 1.02
URINE CULTURE IF INDICATED: ABNORMAL
UROBILINOGEN UR QL STRIP.AUTO: 2 EU/DL (ref 0.2–1)
WBC URNS QL MICRO: ABNORMAL /HPF (ref 0–4)

## 2025-01-23 PROCEDURE — 1100000000 HC RM PRIVATE

## 2025-01-23 PROCEDURE — 6360000002 HC RX W HCPCS: Performed by: STUDENT IN AN ORGANIZED HEALTH CARE EDUCATION/TRAINING PROGRAM

## 2025-01-23 PROCEDURE — 92610 EVALUATE SWALLOWING FUNCTION: CPT

## 2025-01-23 PROCEDURE — 6370000000 HC RX 637 (ALT 250 FOR IP): Performed by: STUDENT IN AN ORGANIZED HEALTH CARE EDUCATION/TRAINING PROGRAM

## 2025-01-23 PROCEDURE — 6370000000 HC RX 637 (ALT 250 FOR IP): Performed by: INTERNAL MEDICINE

## 2025-01-23 PROCEDURE — 81001 URINALYSIS AUTO W/SCOPE: CPT

## 2025-01-23 PROCEDURE — 92611 MOTION FLUOROSCOPY/SWALLOW: CPT

## 2025-01-23 PROCEDURE — 6370000000 HC RX 637 (ALT 250 FOR IP): Performed by: EMERGENCY MEDICINE

## 2025-01-23 PROCEDURE — 2580000003 HC RX 258: Performed by: STUDENT IN AN ORGANIZED HEALTH CARE EDUCATION/TRAINING PROGRAM

## 2025-01-23 PROCEDURE — 6360000002 HC RX W HCPCS: Performed by: EMERGENCY MEDICINE

## 2025-01-23 PROCEDURE — 92522 EVALUATE SPEECH PRODUCTION: CPT

## 2025-01-23 PROCEDURE — 74230 X-RAY XM SWLNG FUNCJ C+: CPT

## 2025-01-23 PROCEDURE — 2580000003 HC RX 258: Performed by: INTERNAL MEDICINE

## 2025-01-23 PROCEDURE — 2500000003 HC RX 250 WO HCPCS: Performed by: STUDENT IN AN ORGANIZED HEALTH CARE EDUCATION/TRAINING PROGRAM

## 2025-01-23 RX ORDER — BACLOFEN 10 MG/1
5 TABLET ORAL 3 TIMES DAILY PRN
COMMUNITY

## 2025-01-23 RX ORDER — FAMOTIDINE 20 MG/1
20 TABLET, FILM COATED ORAL 2 TIMES DAILY
Status: DISCONTINUED | OUTPATIENT
Start: 2025-01-23 | End: 2025-01-24

## 2025-01-23 RX ORDER — 0.9 % SODIUM CHLORIDE 0.9 %
500 INTRAVENOUS SOLUTION INTRAVENOUS ONCE
Status: COMPLETED | OUTPATIENT
Start: 2025-01-23 | End: 2025-01-23

## 2025-01-23 RX ORDER — EDARAVONE 105 MG/5ML
5 KIT ORAL DAILY
COMMUNITY
Start: 2025-01-22

## 2025-01-23 RX ORDER — OMEGA-3-ACID ETHYL ESTERS 1 G/1
1 CAPSULE, LIQUID FILLED ORAL DAILY
Status: DISCONTINUED | OUTPATIENT
Start: 2025-01-23 | End: 2025-01-27 | Stop reason: HOSPADM

## 2025-01-23 RX ORDER — ACETAMINOPHEN 650 MG/1
650 SUPPOSITORY RECTAL EVERY 6 HOURS PRN
Status: DISCONTINUED | OUTPATIENT
Start: 2025-01-23 | End: 2025-01-23

## 2025-01-23 RX ORDER — ASPIRIN 81 MG/1
81 TABLET ORAL 2 TIMES DAILY
Status: DISCONTINUED | OUTPATIENT
Start: 2025-01-23 | End: 2025-01-24 | Stop reason: ALTCHOICE

## 2025-01-23 RX ORDER — SODIUM CHLORIDE 0.9 % (FLUSH) 0.9 %
5-40 SYRINGE (ML) INJECTION EVERY 12 HOURS SCHEDULED
Status: DISCONTINUED | OUTPATIENT
Start: 2025-01-23 | End: 2025-01-27 | Stop reason: HOSPADM

## 2025-01-23 RX ORDER — ATORVASTATIN CALCIUM 40 MG/1
40 TABLET, FILM COATED ORAL DAILY
Status: DISCONTINUED | OUTPATIENT
Start: 2025-01-23 | End: 2025-01-24

## 2025-01-23 RX ORDER — SODIUM CHLORIDE 0.9 % (FLUSH) 0.9 %
5-40 SYRINGE (ML) INJECTION PRN
Status: DISCONTINUED | OUTPATIENT
Start: 2025-01-23 | End: 2025-01-27 | Stop reason: HOSPADM

## 2025-01-23 RX ORDER — ISOSORBIDE MONONITRATE 30 MG/1
30 TABLET, EXTENDED RELEASE ORAL EVERY MORNING
Status: DISCONTINUED | OUTPATIENT
Start: 2025-01-23 | End: 2025-01-27 | Stop reason: HOSPADM

## 2025-01-23 RX ORDER — ACETAMINOPHEN 325 MG/1
650 TABLET ORAL EVERY 6 HOURS PRN
Status: DISCONTINUED | OUTPATIENT
Start: 2025-01-23 | End: 2025-01-27 | Stop reason: HOSPADM

## 2025-01-23 RX ORDER — LISINOPRIL 5 MG/1
5 TABLET ORAL DAILY
Status: DISCONTINUED | OUTPATIENT
Start: 2025-01-23 | End: 2025-01-27 | Stop reason: HOSPADM

## 2025-01-23 RX ORDER — RANOLAZINE 500 MG/1
500 TABLET, EXTENDED RELEASE ORAL 2 TIMES DAILY
Status: DISCONTINUED | OUTPATIENT
Start: 2025-01-23 | End: 2025-01-27 | Stop reason: HOSPADM

## 2025-01-23 RX ORDER — SODIUM CHLORIDE 9 MG/ML
INJECTION, SOLUTION INTRAVENOUS PRN
Status: DISCONTINUED | OUTPATIENT
Start: 2025-01-23 | End: 2025-01-27 | Stop reason: HOSPADM

## 2025-01-23 RX ORDER — ACETAMINOPHEN 500 MG
500 TABLET ORAL EVERY 6 HOURS PRN
Status: DISCONTINUED | OUTPATIENT
Start: 2025-01-23 | End: 2025-01-23

## 2025-01-23 RX ORDER — ACETAMINOPHEN 325 MG/1
650 TABLET ORAL EVERY 6 HOURS PRN
Status: DISCONTINUED | OUTPATIENT
Start: 2025-01-23 | End: 2025-01-23

## 2025-01-23 RX ORDER — AMPICILLIN TRIHYDRATE 250 MG
100 CAPSULE ORAL DAILY
Status: DISCONTINUED | OUTPATIENT
Start: 2025-01-23 | End: 2025-01-27 | Stop reason: HOSPADM

## 2025-01-23 RX ORDER — ACETAMINOPHEN 500 MG
1000 TABLET ORAL EVERY 4 HOURS PRN
COMMUNITY

## 2025-01-23 RX ORDER — NITROGLYCERIN 0.4 MG/1
0.4 TABLET SUBLINGUAL EVERY 5 MIN PRN
COMMUNITY

## 2025-01-23 RX ORDER — ONDANSETRON 2 MG/ML
4 INJECTION INTRAMUSCULAR; INTRAVENOUS EVERY 6 HOURS PRN
Status: DISCONTINUED | OUTPATIENT
Start: 2025-01-23 | End: 2025-01-27 | Stop reason: HOSPADM

## 2025-01-23 RX ORDER — ONDANSETRON 2 MG/ML
4 INJECTION INTRAMUSCULAR; INTRAVENOUS EVERY 4 HOURS PRN
Status: DISCONTINUED | OUTPATIENT
Start: 2025-01-23 | End: 2025-01-23 | Stop reason: SDUPTHER

## 2025-01-23 RX ORDER — ENOXAPARIN SODIUM 100 MG/ML
40 INJECTION SUBCUTANEOUS DAILY
Status: DISCONTINUED | OUTPATIENT
Start: 2025-01-23 | End: 2025-01-27 | Stop reason: HOSPADM

## 2025-01-23 RX ORDER — ATENOLOL 50 MG/1
50 TABLET ORAL DAILY
Status: DISCONTINUED | OUTPATIENT
Start: 2025-01-23 | End: 2025-01-27 | Stop reason: HOSPADM

## 2025-01-23 RX ORDER — SODIUM CHLORIDE 9 MG/ML
INJECTION, SOLUTION INTRAVENOUS CONTINUOUS
Status: DISCONTINUED | OUTPATIENT
Start: 2025-01-23 | End: 2025-01-25

## 2025-01-23 RX ORDER — OSELTAMIVIR PHOSPHATE 75 MG/1
75 CAPSULE ORAL 2 TIMES DAILY
Status: DISCONTINUED | OUTPATIENT
Start: 2025-01-23 | End: 2025-01-24

## 2025-01-23 RX ORDER — RILUZOLE 50 MG/1
50 TABLET, FILM COATED ORAL EVERY 12 HOURS
COMMUNITY

## 2025-01-23 RX ORDER — GUAIFENESIN/DEXTROMETHORPHAN 100-10MG/5
5 SYRUP ORAL EVERY 4 HOURS PRN
Status: DISCONTINUED | OUTPATIENT
Start: 2025-01-23 | End: 2025-01-27 | Stop reason: HOSPADM

## 2025-01-23 RX ORDER — AMLODIPINE BESYLATE 5 MG/1
5 TABLET ORAL DAILY
Status: DISCONTINUED | OUTPATIENT
Start: 2025-01-23 | End: 2025-01-27 | Stop reason: HOSPADM

## 2025-01-23 RX ORDER — RILUZOLE 50 MG/1
50 TABLET, FILM COATED ORAL EVERY 12 HOURS SCHEDULED
Status: DISCONTINUED | OUTPATIENT
Start: 2025-01-23 | End: 2025-01-27 | Stop reason: HOSPADM

## 2025-01-23 RX ADMIN — RILUZOLE 50 MG: 50 TABLET, FILM COATED ORAL at 21:58

## 2025-01-23 RX ADMIN — ASPIRIN 81 MG: 81 TABLET, COATED ORAL at 21:59

## 2025-01-23 RX ADMIN — ISOSORBIDE MONONITRATE 30 MG: 30 TABLET, EXTENDED RELEASE ORAL at 08:41

## 2025-01-23 RX ADMIN — SODIUM CHLORIDE, PRESERVATIVE FREE 10 ML: 5 INJECTION INTRAVENOUS at 22:01

## 2025-01-23 RX ADMIN — ENOXAPARIN SODIUM 40 MG: 100 INJECTION SUBCUTANEOUS at 08:42

## 2025-01-23 RX ADMIN — ASPIRIN 81 MG: 81 TABLET, COATED ORAL at 08:41

## 2025-01-23 RX ADMIN — ONDANSETRON 4 MG: 2 INJECTION INTRAMUSCULAR; INTRAVENOUS at 21:58

## 2025-01-23 RX ADMIN — LISINOPRIL 5 MG: 5 TABLET ORAL at 08:41

## 2025-01-23 RX ADMIN — OSELTAMIVIR PHOSPHATE 75 MG: 75 CAPSULE ORAL at 22:00

## 2025-01-23 RX ADMIN — ACETAMINOPHEN 650 MG: 325 TABLET ORAL at 17:37

## 2025-01-23 RX ADMIN — FAMOTIDINE 20 MG: 20 TABLET, FILM COATED ORAL at 22:00

## 2025-01-23 RX ADMIN — ATENOLOL 50 MG: 50 TABLET ORAL at 08:40

## 2025-01-23 RX ADMIN — RANOLAZINE 500 MG: 500 TABLET, EXTENDED RELEASE ORAL at 22:00

## 2025-01-23 RX ADMIN — GUAIFENESIN, DEXTROMETHORPHAN HBR 1 TABLET: 600; 30 TABLET ORAL at 08:40

## 2025-01-23 RX ADMIN — OSELTAMIVIR PHOSPHATE 75 MG: 75 CAPSULE ORAL at 08:40

## 2025-01-23 RX ADMIN — SODIUM CHLORIDE 500 ML: 900 INJECTION, SOLUTION INTRAVENOUS at 10:30

## 2025-01-23 RX ADMIN — ATORVASTATIN CALCIUM 40 MG: 40 TABLET, FILM COATED ORAL at 08:41

## 2025-01-23 RX ADMIN — SODIUM CHLORIDE 500 ML: 9 INJECTION, SOLUTION INTRAVENOUS at 06:41

## 2025-01-23 RX ADMIN — AMLODIPINE BESYLATE 5 MG: 5 TABLET ORAL at 08:42

## 2025-01-23 RX ADMIN — GUAIFENESIN AND DEXTROMETHORPHAN 5 ML: 100; 10 SYRUP ORAL at 17:12

## 2025-01-23 RX ADMIN — ONDANSETRON 4 MG: 2 INJECTION INTRAMUSCULAR; INTRAVENOUS at 08:33

## 2025-01-23 RX ADMIN — SODIUM CHLORIDE: 9 INJECTION, SOLUTION INTRAVENOUS at 15:27

## 2025-01-23 RX ADMIN — FAMOTIDINE 20 MG: 20 TABLET, FILM COATED ORAL at 08:41

## 2025-01-23 RX ADMIN — RANOLAZINE 500 MG: 500 TABLET, EXTENDED RELEASE ORAL at 08:41

## 2025-01-23 ASSESSMENT — PAIN SCALES - GENERAL
PAINLEVEL_OUTOF10: 3
PAINLEVEL_OUTOF10: 5
PAINLEVEL_OUTOF10: 0

## 2025-01-23 ASSESSMENT — LIFESTYLE VARIABLES
HOW MANY STANDARD DRINKS CONTAINING ALCOHOL DO YOU HAVE ON A TYPICAL DAY: PATIENT DOES NOT DRINK
HOW OFTEN DO YOU HAVE A DRINK CONTAINING ALCOHOL: NEVER

## 2025-01-23 NOTE — ED PROVIDER NOTES
[facility] EMERGENCY DEPARTMENT  EMERGENCY DEPARTMENT ENCOUNTER       Pt Name: Donell Alston  MRN: [id number]  Birthdate 1955  Date of evaluation: 1/22/2025  Provider: Mika Sands MD   PCP: Jesse Morales MD  Note Started: 2:40 AM 1/22/25     CHIEF COMPLAINT       Chief Complaint   Patient presents with    Shortness of Breath     Patient arrives via EMS from home with complaint of shortness of breath. Patient is diagnosed with ALS. Patient was laying down to go to bed and the patient began having shortness of breath. Patient arrives on 2L O2        HISTORY OF PRESENT ILLNESS: 1 or more elements      History From: Patient, History limited by: None     Donell Alston is a 69 y.o. male with a history of ALS, CAD who presents with shortness of breath, cough, positive home flu test.  He reports 2 nights of difficulty breathing, productive cough.  No fever.  No known sick contact, grandson is a flu.  Home flu test was positive this evening.  EMS found him lying in bed, room O2 sat 88%, placed on nasal cannula significantly improved symptoms, my evaluation is on 5 L nasal cannula.     Nursing Notes were all reviewed and agreed with or any disagreements were addressed in the HPI.     REVIEW OF SYSTEMS        Positives and Pertinent negatives as per HPI.    PAST HISTORY     Past Medical History:  Past Medical History:   Diagnosis Date    Arthritis     CAD (coronary artery disease)     Hypertension        Past Surgical History:  Past Surgical History:   Procedure Laterality Date    ANKLE FRACTURE SURGERY Left 12/27/2023    LEFT ANKLE OPEN REDUCTION INTERNAL FIXATION performed by Haresh Farnsworth MD at \[facility]"" MAIN OR    BACK SURGERY  2020    LUMBAR LAMINECTOMY    CARDIAC CATHETERIZATION      x5 stents    CERVICAL FUSION N/A 10/25/2023    ANTERIOR CERVICAL DISCECTOMY AND FUSION C4-5 AND C5-6 performed by Leonard Castillo MD at [facility] MAIN OR    COLONOSCOPY      ORTHOPEDIC SURGERY Left     FEMUR BROKE AND

## 2025-01-23 NOTE — H&P
Hospitalist Admission Note    NAME:Donell Alston   : 1955   MRN: [id number]     Date/Time: 2025 6:31 AM    Patient PCP: Jesse Morales MD    *Please be aware this note is formulated with assistance from Dragon voice-recognition dictation software. Please excuse any errors that may be present*    ______________________________________________________________________  Given the patient's current clinical presentation, I have a high level of concern for decompensation if discharged from the emergency department.  Complex decision making was performed, which includes reviewing the patient's available past medical records, laboratory results, and x-ray films.       My assessment of this patient's clinical condition and my plan of care is as follows.    Problem List:  Patient Active Problem List   Diagnosis    Cervical stenosis of spinal canal    Closed fracture of ankle, trimalleolar, left, initial encounter    Trimalleolar fracture of ankle, closed, left, initial encounter    Influenza         Assessment / Plan:    Acute hypoxic respiratory failure  History of ALS  -Currently requiring supplemental oxygen to maintain saturations  -Chest x-ray without evidence of superimposed infection  -Continue Tamiflu  -Zofran as needed as needed for nausea, did have episode of emesis with Tamiflu in the ED  -Mucinex scheduled for cough and congestion  -Pharmacy consult placed for his outpatient ALS medication which we do not carry  -Wean oxygen as tolerated    History of chronic dysphagia  History of PEG tube in place, not currently using  -Dysphagia diet  -Encourage p.o. dehydration  -Speech consulted for safe swallow    Hypertension  History of CAD  -Continue home medications      Medical Decision Making:   I personally reviewed labs: Yes  I personally reviewed imaging: Yes  I personally reviewed EKG: No  Toxic drug monitoring: no    Discussed case with: ED provider. After discussion I am in agreement that

## 2025-01-24 LAB
ACB COMPLEX DNA BLD POS QL NAA+NON-PROBE: NOT DETECTED
ACCESSION NUMBER, LLC1M: ABNORMAL
ALBUMIN SERPL-MCNC: 3.1 G/DL (ref 3.5–5)
ALBUMIN/GLOB SERPL: 0.8 (ref 1.1–2.2)
ALP SERPL-CCNC: 70 U/L (ref 45–117)
ALT SERPL-CCNC: 34 U/L (ref 12–78)
ANION GAP SERPL CALC-SCNC: 6 MMOL/L (ref 2–12)
AST SERPL-CCNC: 40 U/L (ref 15–37)
B FRAGILIS DNA BLD POS QL NAA+NON-PROBE: NOT DETECTED
BASOPHILS # BLD: 0.02 K/UL (ref 0–0.1)
BASOPHILS NFR BLD: 0.6 % (ref 0–1)
BILIRUB SERPL-MCNC: 0.4 MG/DL (ref 0.2–1)
BIOFIRE TEST COMMENT: ABNORMAL
BUN SERPL-MCNC: 7 MG/DL (ref 6–20)
BUN/CREAT SERPL: 11 (ref 12–20)
C ALBICANS DNA BLD POS QL NAA+NON-PROBE: NOT DETECTED
C AURIS DNA BLD POS QL NAA+NON-PROBE: NOT DETECTED
C GATTII+NEOFOR DNA BLD POS QL NAA+N-PRB: NOT DETECTED
C GLABRATA DNA BLD POS QL NAA+NON-PROBE: NOT DETECTED
C KRUSEI DNA BLD POS QL NAA+NON-PROBE: NOT DETECTED
C PARAP DNA BLD POS QL NAA+NON-PROBE: NOT DETECTED
C TROPICLS DNA BLD POS QL NAA+NON-PROBE: NOT DETECTED
CALCIUM SERPL-MCNC: 8.8 MG/DL (ref 8.5–10.1)
CHLORIDE SERPL-SCNC: 106 MMOL/L (ref 97–108)
CO2 SERPL-SCNC: 25 MMOL/L (ref 21–32)
CREAT SERPL-MCNC: 0.62 MG/DL (ref 0.7–1.3)
DIFFERENTIAL METHOD BLD: ABNORMAL
E CLOAC COMP DNA BLD POS NAA+NON-PROBE: NOT DETECTED
E COLI DNA BLD POS QL NAA+NON-PROBE: NOT DETECTED
E FAECALIS DNA BLD POS QL NAA+NON-PROBE: NOT DETECTED
E FAECIUM DNA BLD POS QL NAA+NON-PROBE: NOT DETECTED
ENTEROBACTERALES DNA BLD POS NAA+N-PRB: NOT DETECTED
EOSINOPHIL # BLD: 0.09 K/UL (ref 0–0.4)
EOSINOPHIL NFR BLD: 2.7 % (ref 0–7)
ERYTHROCYTE [DISTWIDTH] IN BLOOD BY AUTOMATED COUNT: 12.3 % (ref 11.5–14.5)
GLOBULIN SER CALC-MCNC: 3.7 G/DL (ref 2–4)
GLUCOSE SERPL-MCNC: 95 MG/DL (ref 65–100)
GP B STREP DNA BLD POS QL NAA+NON-PROBE: NOT DETECTED
HAEM INFLU DNA BLD POS QL NAA+NON-PROBE: NOT DETECTED
HCT VFR BLD AUTO: 35.7 % (ref 36.6–50.3)
HGB BLD-MCNC: 12.1 G/DL (ref 12.1–17)
IMM GRANULOCYTES # BLD AUTO: 0.01 K/UL (ref 0–0.04)
IMM GRANULOCYTES NFR BLD AUTO: 0.3 % (ref 0–0.5)
K OXYTOCA DNA BLD POS QL NAA+NON-PROBE: NOT DETECTED
KLEBSIELLA SP DNA BLD POS QL NAA+NON-PRB: NOT DETECTED
KLEBSIELLA SP DNA BLD POS QL NAA+NON-PRB: NOT DETECTED
L MONOCYTOG DNA BLD POS QL NAA+NON-PROBE: NOT DETECTED
LYMPHOCYTES # BLD: 1.62 K/UL (ref 0.8–3.5)
LYMPHOCYTES NFR BLD: 47.9 % (ref 12–49)
MAGNESIUM SERPL-MCNC: 1.8 MG/DL (ref 1.6–2.4)
MCH RBC QN AUTO: 33.6 PG (ref 26–34)
MCHC RBC AUTO-ENTMCNC: 33.9 G/DL (ref 30–36.5)
MCV RBC AUTO: 99.2 FL (ref 80–99)
MECA+MECC ISLT/SPM QL: NOT DETECTED
MONOCYTES # BLD: 0.58 K/UL (ref 0–1)
MONOCYTES NFR BLD: 17.2 % (ref 5–13)
N MEN DNA BLD POS QL NAA+NON-PROBE: NOT DETECTED
NEUTS SEG # BLD: 1.06 K/UL (ref 1.8–8)
NEUTS SEG NFR BLD: 31.3 % (ref 32–75)
NRBC # BLD: 0 K/UL (ref 0–0.01)
NRBC BLD-RTO: 0 PER 100 WBC
P AERUGINOSA DNA BLD POS NAA+NON-PROBE: NOT DETECTED
PLATELET # BLD AUTO: 208 K/UL (ref 150–400)
PMV BLD AUTO: 9.3 FL (ref 8.9–12.9)
POTASSIUM SERPL-SCNC: 3.4 MMOL/L (ref 3.5–5.1)
PROT SERPL-MCNC: 6.8 G/DL (ref 6.4–8.2)
PROTEUS SP DNA BLD POS QL NAA+NON-PROBE: NOT DETECTED
RBC # BLD AUTO: 3.6 M/UL (ref 4.1–5.7)
RESISTANT GENE TARGETS: ABNORMAL
S AUREUS DNA BLD POS QL NAA+NON-PROBE: NOT DETECTED
S AUREUS+CONS DNA BLD POS NAA+NON-PROBE: DETECTED
S EPIDERMIDIS DNA BLD POS QL NAA+NON-PRB: DETECTED
S LUGDUNENSIS DNA BLD POS QL NAA+NON-PRB: NOT DETECTED
S MALTOPHILIA DNA BLD POS QL NAA+NON-PRB: NOT DETECTED
S MARCESCENS DNA BLD POS NAA+NON-PROBE: NOT DETECTED
S PNEUM DNA BLD POS QL NAA+NON-PROBE: NOT DETECTED
S PYO DNA BLD POS QL NAA+NON-PROBE: NOT DETECTED
SALMONELLA DNA BLD POS QL NAA+NON-PROBE: NOT DETECTED
SODIUM SERPL-SCNC: 137 MMOL/L (ref 136–145)
STREPTOCOCCUS DNA BLD POS NAA+NON-PROBE: NOT DETECTED
WBC # BLD AUTO: 3.4 K/UL (ref 4.1–11.1)

## 2025-01-24 PROCEDURE — 6370000000 HC RX 637 (ALT 250 FOR IP): Performed by: STUDENT IN AN ORGANIZED HEALTH CARE EDUCATION/TRAINING PROGRAM

## 2025-01-24 PROCEDURE — 6370000000 HC RX 637 (ALT 250 FOR IP): Performed by: INTERNAL MEDICINE

## 2025-01-24 PROCEDURE — 80053 COMPREHEN METABOLIC PANEL: CPT

## 2025-01-24 PROCEDURE — 2580000003 HC RX 258: Performed by: INTERNAL MEDICINE

## 2025-01-24 PROCEDURE — 36415 COLL VENOUS BLD VENIPUNCTURE: CPT

## 2025-01-24 PROCEDURE — 2700000000 HC OXYGEN THERAPY PER DAY

## 2025-01-24 PROCEDURE — 6370000000 HC RX 637 (ALT 250 FOR IP): Performed by: EMERGENCY MEDICINE

## 2025-01-24 PROCEDURE — 6360000002 HC RX W HCPCS: Performed by: STUDENT IN AN ORGANIZED HEALTH CARE EDUCATION/TRAINING PROGRAM

## 2025-01-24 PROCEDURE — 83735 ASSAY OF MAGNESIUM: CPT

## 2025-01-24 PROCEDURE — 85025 COMPLETE CBC W/AUTO DIFF WBC: CPT

## 2025-01-24 PROCEDURE — 92523 SPEECH SOUND LANG COMPREHEN: CPT

## 2025-01-24 PROCEDURE — 94760 N-INVAS EAR/PLS OXIMETRY 1: CPT

## 2025-01-24 PROCEDURE — 87040 BLOOD CULTURE FOR BACTERIA: CPT

## 2025-01-24 PROCEDURE — 1100000000 HC RM PRIVATE

## 2025-01-24 PROCEDURE — 2500000003 HC RX 250 WO HCPCS: Performed by: STUDENT IN AN ORGANIZED HEALTH CARE EDUCATION/TRAINING PROGRAM

## 2025-01-24 PROCEDURE — 92526 ORAL FUNCTION THERAPY: CPT

## 2025-01-24 RX ORDER — OSELTAMIVIR PHOSPHATE 6 MG/ML
75 FOR SUSPENSION ORAL 2 TIMES DAILY
Status: DISCONTINUED | OUTPATIENT
Start: 2025-01-24 | End: 2025-01-27 | Stop reason: HOSPADM

## 2025-01-24 RX ORDER — HYDROXYZINE HYDROCHLORIDE 10 MG/1
10 TABLET, FILM COATED ORAL 3 TIMES DAILY PRN
Status: DISCONTINUED | OUTPATIENT
Start: 2025-01-24 | End: 2025-01-27 | Stop reason: HOSPADM

## 2025-01-24 RX ORDER — ASPIRIN 81 MG/1
81 TABLET, CHEWABLE ORAL 2 TIMES DAILY
Status: DISCONTINUED | OUTPATIENT
Start: 2025-01-24 | End: 2025-01-24

## 2025-01-24 RX ORDER — FAMOTIDINE 20 MG/1
20 TABLET, FILM COATED ORAL 2 TIMES DAILY
Status: DISCONTINUED | OUTPATIENT
Start: 2025-01-24 | End: 2025-01-27 | Stop reason: HOSPADM

## 2025-01-24 RX ORDER — ASPIRIN 81 MG/1
81 TABLET, CHEWABLE ORAL 2 TIMES DAILY
Status: DISCONTINUED | OUTPATIENT
Start: 2025-01-24 | End: 2025-01-27 | Stop reason: HOSPADM

## 2025-01-24 RX ORDER — ATORVASTATIN CALCIUM 40 MG/1
40 TABLET, FILM COATED ORAL DAILY
Status: DISCONTINUED | OUTPATIENT
Start: 2025-01-25 | End: 2025-01-27 | Stop reason: HOSPADM

## 2025-01-24 RX ADMIN — ONDANSETRON 4 MG: 2 INJECTION INTRAMUSCULAR; INTRAVENOUS at 21:22

## 2025-01-24 RX ADMIN — ASPIRIN 81 MG: 81 TABLET, CHEWABLE ORAL at 09:58

## 2025-01-24 RX ADMIN — SODIUM CHLORIDE, PRESERVATIVE FREE 10 ML: 5 INJECTION INTRAVENOUS at 09:31

## 2025-01-24 RX ADMIN — ASPIRIN 81 MG: 81 TABLET, CHEWABLE ORAL at 21:24

## 2025-01-24 RX ADMIN — RILUZOLE 50 MG: 50 TABLET, FILM COATED ORAL at 09:31

## 2025-01-24 RX ADMIN — ACETAMINOPHEN 650 MG: 325 TABLET ORAL at 21:22

## 2025-01-24 RX ADMIN — ONDANSETRON 4 MG: 2 INJECTION INTRAMUSCULAR; INTRAVENOUS at 09:49

## 2025-01-24 RX ADMIN — GUAIFENESIN AND DEXTROMETHORPHAN 5 ML: 100; 10 SYRUP ORAL at 21:22

## 2025-01-24 RX ADMIN — ENOXAPARIN SODIUM 40 MG: 100 INJECTION SUBCUTANEOUS at 09:29

## 2025-01-24 RX ADMIN — FAMOTIDINE 20 MG: 20 TABLET, FILM COATED ORAL at 21:24

## 2025-01-24 RX ADMIN — ACETAMINOPHEN 650 MG: 325 TABLET ORAL at 06:33

## 2025-01-24 RX ADMIN — GUAIFENESIN AND DEXTROMETHORPHAN 5 ML: 100; 10 SYRUP ORAL at 13:17

## 2025-01-24 RX ADMIN — FAMOTIDINE 20 MG: 20 TABLET, FILM COATED ORAL at 09:29

## 2025-01-24 RX ADMIN — SODIUM CHLORIDE: 9 INJECTION, SOLUTION INTRAVENOUS at 05:12

## 2025-01-24 RX ADMIN — ATORVASTATIN CALCIUM 40 MG: 40 TABLET, FILM COATED ORAL at 09:30

## 2025-01-24 RX ADMIN — OSELTAMIVIR PHOSPHATE 75 MG: 75 CAPSULE ORAL at 09:30

## 2025-01-24 RX ADMIN — SODIUM CHLORIDE: 9 INJECTION, SOLUTION INTRAVENOUS at 18:55

## 2025-01-24 RX ADMIN — OSELTAMIVIR PHOSPHATE 75 MG: 6 POWDER, FOR SUSPENSION ORAL at 21:24

## 2025-01-24 RX ADMIN — HYDROXYZINE HYDROCHLORIDE 10 MG: 10 TABLET ORAL at 22:16

## 2025-01-24 RX ADMIN — GUAIFENESIN AND DEXTROMETHORPHAN 5 ML: 100; 10 SYRUP ORAL at 06:31

## 2025-01-24 RX ADMIN — SODIUM CHLORIDE, PRESERVATIVE FREE 10 ML: 5 INJECTION INTRAVENOUS at 21:24

## 2025-01-24 ASSESSMENT — PAIN SCALES - GENERAL
PAINLEVEL_OUTOF10: 2
PAINLEVEL_OUTOF10: 0
PAINLEVEL_OUTOF10: 2

## 2025-01-24 ASSESSMENT — PAIN DESCRIPTION - DESCRIPTORS: DESCRIPTORS: ACHING

## 2025-01-24 ASSESSMENT — PAIN DESCRIPTION - LOCATION: LOCATION: OTHER (COMMENT)

## 2025-01-24 NOTE — CARE COORDINATION
Care Management Initial Assessment       RUR: 12% (low RUR)   Readmission? No  1st IM letter given? Yes - Pt access 01/22  1st  letter given: No    CM contacted pt's wife d/t isolation precautions  at bedside. CM introduced self and role and completed initial assessment. CM verified demographic and clinical information.     PCP is Dr. Morales, preferred pharmacy is [facility] in [place].     Pt lives with wife in a multi level home, 0 AMI, has a ramp through back entrance. Reports being dependent in ADLs including toileting and feeding, wife is patient's caregiver. Reports WC, home vent and suction machine at home. Reports they are supported by their wife. Patient is not an active . Denies history of IPR/SNF, has a home health RN through [facility].    Pt plans to discharge home with wife as caregiver and [facility] SN. CM reached out to [facility] to confirm RN. Patient will need stretcher at d/c, and wife has requested additonal information regarding medical transport to and from appointments. CM will compile resources.     CM will continue to follow.        01/24/25 1202   Service Assessment   Patient Orientation Alert and Oriented;Person;Place;Situation;Self   Cognition Alert   History Provided By Patient;Significant Other   Primary Caregiver Spouse   Accompanied By/Relationship Wife   Support Systems Spouse/Significant Other;Family Members   Patient's Healthcare Decision Maker is: Legal Next of Kin   PCP Verified by CM Yes   Last Visit to PCP Within last 3 months   Prior Functional Level Assistance with the following:;Bathing;Dressing;Toileting;Feeding;Cooking;Housework;Shopping;Mobility   Current Functional Level Assistance with the following:;Dressing;Bathing;Toileting;Feeding;Cooking;Housework;Shopping;Mobility   Can patient return to prior living arrangement Yes   Ability to make needs known: Fair   Family able to assist with home care needs: Yes  (Wife is 24/7 caregiver)   Would you like for me to discuss the

## 2025-01-25 LAB
ALBUMIN SERPL-MCNC: 2.9 G/DL (ref 3.5–5)
ALBUMIN/GLOB SERPL: 0.9 (ref 1.1–2.2)
ALP SERPL-CCNC: 64 U/L (ref 45–117)
ALT SERPL-CCNC: 30 U/L (ref 12–78)
ANION GAP SERPL CALC-SCNC: 6 MMOL/L (ref 2–12)
AST SERPL-CCNC: 33 U/L (ref 15–37)
BASOPHILS # BLD: 0.02 K/UL (ref 0–0.1)
BASOPHILS NFR BLD: 0.5 % (ref 0–1)
BILIRUB SERPL-MCNC: 0.4 MG/DL (ref 0.2–1)
BUN SERPL-MCNC: 3 MG/DL (ref 6–20)
BUN/CREAT SERPL: 7 (ref 12–20)
CALCIUM SERPL-MCNC: 8.7 MG/DL (ref 8.5–10.1)
CHLORIDE SERPL-SCNC: 106 MMOL/L (ref 97–108)
CO2 SERPL-SCNC: 26 MMOL/L (ref 21–32)
CREAT SERPL-MCNC: 0.44 MG/DL (ref 0.7–1.3)
DIFFERENTIAL METHOD BLD: ABNORMAL
EOSINOPHIL # BLD: 0.19 K/UL (ref 0–0.4)
EOSINOPHIL NFR BLD: 4.5 % (ref 0–7)
ERYTHROCYTE [DISTWIDTH] IN BLOOD BY AUTOMATED COUNT: 12.4 % (ref 11.5–14.5)
GLOBULIN SER CALC-MCNC: 3.4 G/DL (ref 2–4)
GLUCOSE BLD STRIP.AUTO-MCNC: 85 MG/DL (ref 65–117)
GLUCOSE SERPL-MCNC: 88 MG/DL (ref 65–100)
HCT VFR BLD AUTO: 37.3 % (ref 36.6–50.3)
HGB BLD-MCNC: 12.7 G/DL (ref 12.1–17)
IMM GRANULOCYTES # BLD AUTO: 0.01 K/UL (ref 0–0.04)
IMM GRANULOCYTES NFR BLD AUTO: 0.2 % (ref 0–0.5)
LYMPHOCYTES # BLD: 1.81 K/UL (ref 0.8–3.5)
LYMPHOCYTES NFR BLD: 42.6 % (ref 12–49)
MAGNESIUM SERPL-MCNC: 1.8 MG/DL (ref 1.6–2.4)
MCH RBC QN AUTO: 33.4 PG (ref 26–34)
MCHC RBC AUTO-ENTMCNC: 34 G/DL (ref 30–36.5)
MCV RBC AUTO: 98.2 FL (ref 80–99)
MONOCYTES # BLD: 0.53 K/UL (ref 0–1)
MONOCYTES NFR BLD: 12.5 % (ref 5–13)
NEUTS SEG # BLD: 1.69 K/UL (ref 1.8–8)
NEUTS SEG NFR BLD: 39.7 % (ref 32–75)
NRBC # BLD: 0 K/UL (ref 0–0.01)
NRBC BLD-RTO: 0 PER 100 WBC
PLATELET # BLD AUTO: 196 K/UL (ref 150–400)
PMV BLD AUTO: 9.4 FL (ref 8.9–12.9)
POTASSIUM SERPL-SCNC: 3.2 MMOL/L (ref 3.5–5.1)
PROT SERPL-MCNC: 6.3 G/DL (ref 6.4–8.2)
RBC # BLD AUTO: 3.8 M/UL (ref 4.1–5.7)
SERVICE CMNT-IMP: NORMAL
SODIUM SERPL-SCNC: 138 MMOL/L (ref 136–145)
WBC # BLD AUTO: 4.3 K/UL (ref 4.1–11.1)

## 2025-01-25 PROCEDURE — 85025 COMPLETE CBC W/AUTO DIFF WBC: CPT

## 2025-01-25 PROCEDURE — 2700000000 HC OXYGEN THERAPY PER DAY

## 2025-01-25 PROCEDURE — 94760 N-INVAS EAR/PLS OXIMETRY 1: CPT

## 2025-01-25 PROCEDURE — 80053 COMPREHEN METABOLIC PANEL: CPT

## 2025-01-25 PROCEDURE — 6370000000 HC RX 637 (ALT 250 FOR IP): Performed by: INTERNAL MEDICINE

## 2025-01-25 PROCEDURE — 6360000002 HC RX W HCPCS: Performed by: STUDENT IN AN ORGANIZED HEALTH CARE EDUCATION/TRAINING PROGRAM

## 2025-01-25 PROCEDURE — 83735 ASSAY OF MAGNESIUM: CPT

## 2025-01-25 PROCEDURE — 1100000000 HC RM PRIVATE

## 2025-01-25 PROCEDURE — 6370000000 HC RX 637 (ALT 250 FOR IP): Performed by: STUDENT IN AN ORGANIZED HEALTH CARE EDUCATION/TRAINING PROGRAM

## 2025-01-25 PROCEDURE — 36415 COLL VENOUS BLD VENIPUNCTURE: CPT

## 2025-01-25 PROCEDURE — 2580000003 HC RX 258: Performed by: INTERNAL MEDICINE

## 2025-01-25 PROCEDURE — 2500000003 HC RX 250 WO HCPCS: Performed by: STUDENT IN AN ORGANIZED HEALTH CARE EDUCATION/TRAINING PROGRAM

## 2025-01-25 PROCEDURE — 6370000000 HC RX 637 (ALT 250 FOR IP): Performed by: EMERGENCY MEDICINE

## 2025-01-25 PROCEDURE — 82962 GLUCOSE BLOOD TEST: CPT

## 2025-01-25 RX ORDER — POTASSIUM CHLORIDE 1.5 G/1.58G
40 POWDER, FOR SOLUTION ORAL ONCE
Status: COMPLETED | OUTPATIENT
Start: 2025-01-25 | End: 2025-01-25

## 2025-01-25 RX ADMIN — ENOXAPARIN SODIUM 40 MG: 100 INJECTION SUBCUTANEOUS at 10:29

## 2025-01-25 RX ADMIN — RILUZOLE 50 MG: 50 TABLET, FILM COATED ORAL at 10:30

## 2025-01-25 RX ADMIN — FAMOTIDINE 20 MG: 20 TABLET, FILM COATED ORAL at 10:31

## 2025-01-25 RX ADMIN — POTASSIUM CHLORIDE 40 MEQ: 1.5 FOR SOLUTION ORAL at 13:39

## 2025-01-25 RX ADMIN — OSELTAMIVIR PHOSPHATE 75 MG: 6 POWDER, FOR SUSPENSION ORAL at 10:28

## 2025-01-25 RX ADMIN — ONDANSETRON 4 MG: 2 INJECTION INTRAMUSCULAR; INTRAVENOUS at 21:30

## 2025-01-25 RX ADMIN — ATORVASTATIN CALCIUM 40 MG: 40 TABLET, FILM COATED ORAL at 10:31

## 2025-01-25 RX ADMIN — HYDROXYZINE HYDROCHLORIDE 10 MG: 10 TABLET ORAL at 21:30

## 2025-01-25 RX ADMIN — GUAIFENESIN AND DEXTROMETHORPHAN 5 ML: 100; 10 SYRUP ORAL at 15:32

## 2025-01-25 RX ADMIN — ASPIRIN 81 MG: 81 TABLET, CHEWABLE ORAL at 10:31

## 2025-01-25 RX ADMIN — FAMOTIDINE 20 MG: 20 TABLET, FILM COATED ORAL at 21:30

## 2025-01-25 RX ADMIN — GUAIFENESIN AND DEXTROMETHORPHAN 5 ML: 100; 10 SYRUP ORAL at 21:30

## 2025-01-25 RX ADMIN — GUAIFENESIN AND DEXTROMETHORPHAN 5 ML: 100; 10 SYRUP ORAL at 10:30

## 2025-01-25 RX ADMIN — RILUZOLE 50 MG: 50 TABLET, FILM COATED ORAL at 15:34

## 2025-01-25 RX ADMIN — ASPIRIN 81 MG: 81 TABLET, CHEWABLE ORAL at 21:32

## 2025-01-25 RX ADMIN — SODIUM CHLORIDE, PRESERVATIVE FREE 10 ML: 5 INJECTION INTRAVENOUS at 21:31

## 2025-01-25 RX ADMIN — SODIUM CHLORIDE: 9 INJECTION, SOLUTION INTRAVENOUS at 07:53

## 2025-01-25 RX ADMIN — OSELTAMIVIR PHOSPHATE 75 MG: 6 POWDER, FOR SUSPENSION ORAL at 21:30

## 2025-01-25 RX ADMIN — ONDANSETRON 4 MG: 2 INJECTION INTRAMUSCULAR; INTRAVENOUS at 10:28

## 2025-01-25 RX ADMIN — ACETAMINOPHEN 650 MG: 325 TABLET ORAL at 10:29

## 2025-01-25 RX ADMIN — ISOSORBIDE MONONITRATE 30 MG: 30 TABLET, EXTENDED RELEASE ORAL at 10:31

## 2025-01-25 ASSESSMENT — PAIN DESCRIPTION - LOCATION
LOCATION: OTHER (COMMENT)
LOCATION: OTHER (COMMENT)

## 2025-01-25 ASSESSMENT — PAIN SCALES - GENERAL
PAINLEVEL_OUTOF10: 4
PAINLEVEL_OUTOF10: 0
PAINLEVEL_OUTOF10: 4

## 2025-01-26 LAB
ANION GAP SERPL CALC-SCNC: 6 MMOL/L (ref 2–12)
BACTERIA SPEC CULT: ABNORMAL
BUN SERPL-MCNC: 7 MG/DL (ref 6–20)
BUN/CREAT SERPL: 15 (ref 12–20)
CALCIUM SERPL-MCNC: 9.1 MG/DL (ref 8.5–10.1)
CHLORIDE SERPL-SCNC: 106 MMOL/L (ref 97–108)
CO2 SERPL-SCNC: 27 MMOL/L (ref 21–32)
CREAT SERPL-MCNC: 0.48 MG/DL (ref 0.7–1.3)
GLUCOSE SERPL-MCNC: 88 MG/DL (ref 65–100)
POTASSIUM SERPL-SCNC: 3.7 MMOL/L (ref 3.5–5.1)
SERVICE CMNT-IMP: ABNORMAL
SERVICE CMNT-IMP: ABNORMAL
SODIUM SERPL-SCNC: 139 MMOL/L (ref 136–145)

## 2025-01-26 PROCEDURE — 6370000000 HC RX 637 (ALT 250 FOR IP): Performed by: INTERNAL MEDICINE

## 2025-01-26 PROCEDURE — 6370000000 HC RX 637 (ALT 250 FOR IP): Performed by: STUDENT IN AN ORGANIZED HEALTH CARE EDUCATION/TRAINING PROGRAM

## 2025-01-26 PROCEDURE — 94760 N-INVAS EAR/PLS OXIMETRY 1: CPT

## 2025-01-26 PROCEDURE — 1100000000 HC RM PRIVATE

## 2025-01-26 PROCEDURE — 2500000003 HC RX 250 WO HCPCS: Performed by: STUDENT IN AN ORGANIZED HEALTH CARE EDUCATION/TRAINING PROGRAM

## 2025-01-26 PROCEDURE — 80048 BASIC METABOLIC PNL TOTAL CA: CPT

## 2025-01-26 PROCEDURE — 36415 COLL VENOUS BLD VENIPUNCTURE: CPT

## 2025-01-26 PROCEDURE — 6360000002 HC RX W HCPCS: Performed by: STUDENT IN AN ORGANIZED HEALTH CARE EDUCATION/TRAINING PROGRAM

## 2025-01-26 PROCEDURE — 6370000000 HC RX 637 (ALT 250 FOR IP): Performed by: EMERGENCY MEDICINE

## 2025-01-26 PROCEDURE — 2700000000 HC OXYGEN THERAPY PER DAY

## 2025-01-26 RX ORDER — CASTOR OIL AND BALSAM, PERU 788; 87 MG/G; MG/G
OINTMENT TOPICAL 2 TIMES DAILY
Status: DISCONTINUED | OUTPATIENT
Start: 2025-01-26 | End: 2025-01-27 | Stop reason: HOSPADM

## 2025-01-26 RX ORDER — OSELTAMIVIR PHOSPHATE 6 MG/ML
75 FOR SUSPENSION ORAL 2 TIMES DAILY
Qty: 50 ML | Refills: 0 | Status: SHIPPED | OUTPATIENT
Start: 2025-01-26 | End: 2025-01-28

## 2025-01-26 RX ORDER — GUAIFENESIN/DEXTROMETHORPHAN 100-10MG/5
5 SYRUP ORAL EVERY 4 HOURS PRN
Qty: 120 ML | Refills: 0 | Status: SHIPPED | OUTPATIENT
Start: 2025-01-26 | End: 2025-02-05

## 2025-01-26 RX ADMIN — GUAIFENESIN AND DEXTROMETHORPHAN 5 ML: 100; 10 SYRUP ORAL at 09:42

## 2025-01-26 RX ADMIN — SODIUM CHLORIDE, PRESERVATIVE FREE 10 ML: 5 INJECTION INTRAVENOUS at 09:41

## 2025-01-26 RX ADMIN — ASPIRIN 81 MG: 81 TABLET, CHEWABLE ORAL at 20:48

## 2025-01-26 RX ADMIN — FAMOTIDINE 20 MG: 20 TABLET, FILM COATED ORAL at 09:43

## 2025-01-26 RX ADMIN — ATORVASTATIN CALCIUM 40 MG: 40 TABLET, FILM COATED ORAL at 09:43

## 2025-01-26 RX ADMIN — FAMOTIDINE 20 MG: 20 TABLET, FILM COATED ORAL at 20:48

## 2025-01-26 RX ADMIN — OSELTAMIVIR PHOSPHATE 75 MG: 6 POWDER, FOR SUSPENSION ORAL at 20:48

## 2025-01-26 RX ADMIN — RILUZOLE 50 MG: 50 TABLET, FILM COATED ORAL at 09:40

## 2025-01-26 RX ADMIN — RILUZOLE 50 MG: 50 TABLET, FILM COATED ORAL at 15:39

## 2025-01-26 RX ADMIN — ONDANSETRON 4 MG: 2 INJECTION INTRAMUSCULAR; INTRAVENOUS at 09:38

## 2025-01-26 RX ADMIN — GUAIFENESIN AND DEXTROMETHORPHAN 5 ML: 100; 10 SYRUP ORAL at 20:48

## 2025-01-26 RX ADMIN — ONDANSETRON 4 MG: 2 INJECTION INTRAMUSCULAR; INTRAVENOUS at 20:48

## 2025-01-26 RX ADMIN — Medication: at 20:52

## 2025-01-26 RX ADMIN — ATENOLOL 50 MG: 50 TABLET ORAL at 09:43

## 2025-01-26 RX ADMIN — SODIUM CHLORIDE, PRESERVATIVE FREE 10 ML: 5 INJECTION INTRAVENOUS at 20:48

## 2025-01-26 RX ADMIN — ISOSORBIDE MONONITRATE 30 MG: 30 TABLET, EXTENDED RELEASE ORAL at 09:43

## 2025-01-26 RX ADMIN — OSELTAMIVIR PHOSPHATE 75 MG: 6 POWDER, FOR SUSPENSION ORAL at 09:42

## 2025-01-26 RX ADMIN — ASPIRIN 81 MG: 81 TABLET, CHEWABLE ORAL at 09:43

## 2025-01-26 RX ADMIN — ACETAMINOPHEN 650 MG: 325 TABLET ORAL at 09:43

## 2025-01-26 RX ADMIN — ENOXAPARIN SODIUM 40 MG: 100 INJECTION SUBCUTANEOUS at 09:44

## 2025-01-26 RX ADMIN — HYDROXYZINE HYDROCHLORIDE 10 MG: 10 TABLET ORAL at 20:48

## 2025-01-26 RX ADMIN — HYDROXYZINE HYDROCHLORIDE 10 MG: 10 TABLET ORAL at 12:24

## 2025-01-26 RX ADMIN — AMLODIPINE BESYLATE 5 MG: 5 TABLET ORAL at 09:43

## 2025-01-26 ASSESSMENT — PAIN SCALES - GENERAL
PAINLEVEL_OUTOF10: 2
PAINLEVEL_OUTOF10: 0
PAINLEVEL_OUTOF10: 2

## 2025-01-26 ASSESSMENT — PAIN DESCRIPTION - LOCATION: LOCATION: OTHER (COMMENT)

## 2025-01-26 NOTE — DISCHARGE SUMMARY
Discharge Summary    Name: Donell Alston  [id number]  YOB: 1955 (Age: 69 y.o.)   Date of Admission: 1/22/2025  Date of Discharge: 1/26/2025  Attending Physician: Tim Mcmahon MD    Discharge Diagnosis:   Acute hypoxic respiratory failure POA- resolved  Due to Influenza A infection POA- cont Tamiflu, robitussin DM  History of ALS  Coag neg Staph bacteremia POA- repeat Cx negative, no Abx needed  History of chronic dysphagia  History of PEG tube in place  Hypertension  History of CAD  Full code        Consultations:  IP CONSULT TO PHARMACY      Brief Admission History/Reason for Admission Per Jerardo Avina MD:   \" 69 y.o.  male with PMHx significant for  has a past medical history of Arthritis, CAD (coronary artery disease), and Hypertension. who presents with the above chief complaint.      We were asked to admit for work up and further evaluation.      Patient here accompanied by family who provide history.  States that history of history of ALS.  Notes that he follows with [facility] for this.  Endorses 3 days of recent shortness of breath as well as increasing nausea and frequent dry cough.  No fevers or chills.  No production to his cough\"    Brief Hospital Course by Main Problems:   Acute hypoxic respiratory failure 2/2 influenza A infection  Influenza A infection  History of ALS  -Currently requiring supplemental oxygen to maintain saturations  -Chest x-ray without evidence of superimposed infection  -Continue Tamiflu  -Zofran as needed as needed for nausea, did have episode of emesis with Tamiflu in the ED  -Mucinex scheduled for cough and congestion  -Pharmacy consult placed for his outpatient ALS medication which we do not carry  -Wean oxygen as tolerated     Coag neg Staph bacteremia POA- 1/2 tubes  -Blood cultures growing staph coagulase negative, 1 out of 2 tubes only, likely contaminant, repeat Blood cx neg >24 hrs now  -Patient is asymptomatic, no

## 2025-01-26 NOTE — CARE COORDINATION
11:49AM UPDATE  Discharge orders placed. Plan for pt to d/c home w/ [facility] orders. Spouse to continue to provide 24/7 care for pt. CM provided information for [facility] (transportation) on AVS. MD confirmed pt is ready for d/c and weaned off of O2.     11:49AM UPDATE  CM spoke w/ nursing to confirm d/c for today. Informed pt is still wearing continuous O2 at this time. CM reached out to MD for further clarification on home O2 needs. Based on review of diagnoses, unsure if pt has a qualifying Medicare diagnosis. Pending response.    12:00PM UPDATE  Per MD, pt recovers with mucus expectoration (machine from [facility] at bedside currently of theirs). MD reports if pt needs O2, to request 1-2LPM with dx of ALS with poor mucopulmonary clearance. Unsure at this time if this would be covered by Medicare. MD requested CM contact spouse to verify if they already have home O2 setup or if his O2 saturations recover after expectoration. CM attempted to call pt's spouse (Linda, [phone]). Confidential VM left w/ call back information.     12:12PM UPDATE  CM spoke with pt & spouse. Report pt has been extremely anxious since being told he'd be discharged today without O2. Confirmed they do not have O2 setup at home. Spouse unable to confirm if O2 sats improve following use of expectoration device, but reports he's needed O2 support since being in the hospital. CM advised will need Home O2 challenge completed by nursing and will submit order to DME company to run insurance, but unsure if have a qualifying condition for CMS. Will attempt to verify. Pt/spouse inquired if pt would still be discharging today. CM advised will see what O2 challenge results show and if insurance will cover O2 at home. CM notified MD & Nursing. Requested nursing complete O2 challenge (likely will need to do bed mobility since pt is w/c bound). Nursing to call once O2 challenge is available for documentation.     3:57PM UPDATE  CM spoke

## 2025-01-26 NOTE — DISCHARGE INSTRUCTIONS
HOSPITALIST DISCHARGE INSTRUCTIONS    NAME: Donell Alston   :  1955   MRN:  [id number]     Date/Time:  2025 10:00 AM    ADMIT DATE: 2025     DISCHARGE DATE: 2025     DISCHARGE DIAGNOSIS:  Acute hypoxic respiratory failure POA- resolved  Due to Influenza A infection POA- cont Tamiflu, robitussin DM  History of ALS  Coag neg Staph bacteremia POA- repeat Cx negative, no Abx needed  History of chronic dysphagia  History of PEG tube in place  Hypertension  History of CAD  Full code    MEDICATIONS:  As per medication reconciliation  list  It is important that you take the medication exactly as they are prescribed.   Keep your medication in the bottles provided by the pharmacist and keep a list of the medication names, dosages, and times to be taken in your wallet.   Do not take other medications without consulting your doctor.     Pain Management: per above medications    What to do at Home    Recommended diet:   Dysphagia diet  with Crushed meds as recc    Recommended activity: activity as tolerated    If you have questions regarding the hospital related prescriptions or hospital related issues please call at .    If you experience any of the following symptoms then please call your primary care physician or return to the emergency room if you cannot get hold of your doctor:  Fever, chills, nausea, vomiting, diarrhea, change in mentation, falling, bleeding, shortness of breath,     Follow Up:   @PCP@  you are to call and set up an appointment to see them in 7-10 days.      Information obtained by :  I understand that if any problems occur once I am at home I am to contact my physician.    I understand and acknowledge receipt of the instructions indicated above.                                                                                                                                           Physician's or R.N.'s Signature

## 2025-01-27 ENCOUNTER — APPOINTMENT (OUTPATIENT)
Facility: HOSPITAL | Age: 70
DRG: 193 | End: 2025-01-27
Attending: STUDENT IN AN ORGANIZED HEALTH CARE EDUCATION/TRAINING PROGRAM
Payer: MEDICARE

## 2025-01-27 VITALS
HEIGHT: 70 IN | HEART RATE: 59 BPM | OXYGEN SATURATION: 94 % | BODY MASS INDEX: 22.09 KG/M2 | WEIGHT: 154.32 LBS | DIASTOLIC BLOOD PRESSURE: 69 MMHG | RESPIRATION RATE: 16 BRPM | TEMPERATURE: 98.2 F | SYSTOLIC BLOOD PRESSURE: 155 MMHG

## 2025-01-27 PROCEDURE — 2700000000 HC OXYGEN THERAPY PER DAY

## 2025-01-27 PROCEDURE — 94760 N-INVAS EAR/PLS OXIMETRY 1: CPT

## 2025-01-27 PROCEDURE — 2500000003 HC RX 250 WO HCPCS: Performed by: STUDENT IN AN ORGANIZED HEALTH CARE EDUCATION/TRAINING PROGRAM

## 2025-01-27 PROCEDURE — 6370000000 HC RX 637 (ALT 250 FOR IP): Performed by: STUDENT IN AN ORGANIZED HEALTH CARE EDUCATION/TRAINING PROGRAM

## 2025-01-27 PROCEDURE — 6360000002 HC RX W HCPCS: Performed by: STUDENT IN AN ORGANIZED HEALTH CARE EDUCATION/TRAINING PROGRAM

## 2025-01-27 PROCEDURE — 6370000000 HC RX 637 (ALT 250 FOR IP): Performed by: INTERNAL MEDICINE

## 2025-01-27 RX ORDER — HYDROXYZINE HYDROCHLORIDE 10 MG/1
10 TABLET, FILM COATED ORAL 3 TIMES DAILY PRN
Qty: 60 TABLET | Refills: 0 | Status: SHIPPED | OUTPATIENT
Start: 2025-01-27 | End: 2025-02-26

## 2025-01-27 RX ADMIN — ATORVASTATIN CALCIUM 40 MG: 40 TABLET, FILM COATED ORAL at 09:16

## 2025-01-27 RX ADMIN — ONDANSETRON 4 MG: 2 INJECTION INTRAMUSCULAR; INTRAVENOUS at 09:10

## 2025-01-27 RX ADMIN — Medication: at 09:31

## 2025-01-27 RX ADMIN — ENOXAPARIN SODIUM 40 MG: 100 INJECTION SUBCUTANEOUS at 09:11

## 2025-01-27 RX ADMIN — FAMOTIDINE 20 MG: 20 TABLET, FILM COATED ORAL at 09:17

## 2025-01-27 RX ADMIN — SODIUM CHLORIDE, PRESERVATIVE FREE 10 ML: 5 INJECTION INTRAVENOUS at 09:30

## 2025-01-27 RX ADMIN — OSELTAMIVIR PHOSPHATE 75 MG: 6 POWDER, FOR SUSPENSION ORAL at 09:19

## 2025-01-27 RX ADMIN — ISOSORBIDE MONONITRATE 30 MG: 30 TABLET, EXTENDED RELEASE ORAL at 09:16

## 2025-01-27 RX ADMIN — ASPIRIN 81 MG: 81 TABLET, CHEWABLE ORAL at 09:16

## 2025-01-27 RX ADMIN — RILUZOLE 50 MG: 50 TABLET, FILM COATED ORAL at 05:24

## 2025-01-27 RX ADMIN — AMLODIPINE BESYLATE 5 MG: 5 TABLET ORAL at 09:17

## 2025-01-27 NOTE — CARE COORDINATION
Home with HH, daughter to transport.   Clear from CM.     Transition of Care Plan:    RUR: 12% (low RUR)   Prior Level of Functioning: Dependent, wife is 24/7 caregiver  Disposition: Home with [facility]   CHERELLE: 01/27/24  If SNF or IPR: Date FOC offered:   Date FOC received:   Accepting facility:   Date authorization started with reference number:   Date authorization received and expires:   Follow up appointments: PCP/Specialists as indicated  DME needed: None  Transportation at discharge: Daughter  IM/IMM Medicare/ letter given: Last given 01/27  Is patient a [place] and connected with [facility]? N/a   If yes, was [place] transfer form completed and [facility] notified? N/a  Caregiver Contact: SCARLETT CHAND - Spouse - [phone]   Discharge Caregiver contacted prior to discharge? CM to contact  Care Conference needed? Not at this time  Barriers to discharge: None    0818 - Chart reviewed from weekend. Noted plan remains for home with d/c order in place. Pt will need stretcher transport.     Noted some concern over the weekend that patient may be requiring O2. Noted pt passed O2 challenge yesterday. CM will ask RN to re-challenge today, pt will need to drop below 88% to qualify. CM following for results and on standby to arrange transport.     1140 - Passed O2 challenge. Met with pt and wife, reported daughter is coming to transport. CM made HH agency aware of d/c. CM needs complete. Clear from CM for d/c. .       01/27/25 1141   Services At/After Discharge   Transition of Care Consult (CM Consult) Discharge Planning;Home Health   Services At/After Discharge Home Health;Community Resources   [place] Resource Information Provided? No   Mode of Transport at Discharge Other (see comment)  (Daughter)   Confirm Follow Up Transport Family   Condition of Participation: Discharge Planning   The Plan for Transition of Care is related to the following treatment goals: Home with [facility] to continue care goals   The Patient and/or

## 2025-01-27 NOTE — PLAN OF CARE
Problem: Safety - Adult  Goal: Free from fall injury  1/25/2025 0016 by Brianna Gonzalez RN  Outcome: Progressing  1/24/2025 1234 by Gerda Heller RN  Outcome: Progressing     Problem: Discharge Planning  Goal: Discharge to home or other facility with appropriate resources  Recent Flowsheet Documentation  Taken 1/24/2025 2122 by Brianna Gonzalez RN  Discharge to home or other facility with appropriate resources:   Identify barriers to discharge with patient and caregiver   Arrange for needed discharge resources and transportation as appropriate  1/24/2025 1234 by Gerda Heller RN  Outcome: Progressing     Problem: Skin/Tissue Integrity  Goal: Skin integrity remains intact  Description: 1.  Monitor for areas of redness and/or skin breakdown  2.  Assess vascular access sites hourly  3.  Every 4-6 hours minimum:  Change oxygen saturation probe site  4.  Every 4-6 hours:  If on nasal continuous positive airway pressure, respiratory therapy assess nares and determine need for appliance change or resting period  Outcome: Progressing  Flowsheets (Taken 1/24/2025 2122)  Skin Integrity Remains Intact: Monitor for areas of redness and/or skin breakdown     Problem: Pain  Goal: Verbalizes/displays adequate comfort level or baseline comfort level  Outcome: Progressing     
  Problem: Safety - Adult  Goal: Free from fall injury  1/25/2025 1103 by Lavonne Coyle RN  Outcome: Progressing  1/25/2025 0016 by Brianna Gonzalez RN  Outcome: Progressing     Problem: Discharge Planning  Goal: Discharge to home or other facility with appropriate resources  Outcome: Progressing  Flowsheets (Taken 1/24/2025 2122 by Brianna Gonzalez, RN)  Discharge to home or other facility with appropriate resources:   Identify barriers to discharge with patient and caregiver   Arrange for needed discharge resources and transportation as appropriate     Problem: Skin/Tissue Integrity  Goal: Skin integrity remains intact  Description: 1.  Monitor for areas of redness and/or skin breakdown  2.  Assess vascular access sites hourly  3.  Every 4-6 hours minimum:  Change oxygen saturation probe site  4.  Every 4-6 hours:  If on nasal continuous positive airway pressure, respiratory therapy assess nares and determine need for appliance change or resting period  1/25/2025 1103 by Lavonne Coyle RN  Outcome: Progressing  1/25/2025 0016 by Brianna Gonzalez RN  Outcome: Progressing  Flowsheets (Taken 1/24/2025 2122)  Skin Integrity Remains Intact: Monitor for areas of redness and/or skin breakdown     Problem: ABCDS Injury Assessment  Goal: Absence of physical injury  Outcome: Progressing     Problem: Pain  Goal: Verbalizes/displays adequate comfort level or baseline comfort level  1/25/2025 1103 by Lavonne Coyle RN  Outcome: Progressing  1/25/2025 0016 by Brianna Gonzalez RN  Outcome: Progressing     
  Problem: Safety - Adult  Goal: Free from fall injury  1/25/2025 2346 by Brianna Gonzalez RN  Outcome: Progressing  1/25/2025 1103 by Lavonne Coyle RN  Outcome: Progressing     Problem: Discharge Planning  Goal: Discharge to home or other facility with appropriate resources  1/25/2025 2346 by Brianna Gonzalez RN  Outcome: Progressing  1/25/2025 1103 by Lavonne Coyle RN  Outcome: Progressing  Flowsheets (Taken 1/24/2025 2122 by Brianna Gonzalez, RN)  Discharge to home or other facility with appropriate resources:   Identify barriers to discharge with patient and caregiver   Arrange for needed discharge resources and transportation as appropriate     Problem: Skin/Tissue Integrity  Goal: Skin integrity remains intact  Description: 1.  Monitor for areas of redness and/or skin breakdown  2.  Assess vascular access sites hourly  3.  Every 4-6 hours minimum:  Change oxygen saturation probe site  4.  Every 4-6 hours:  If on nasal continuous positive airway pressure, respiratory therapy assess nares and determine need for appliance change or resting period  1/25/2025 2346 by Brianna Gonzalez RN  Outcome: Progressing  1/25/2025 1103 by Lavonne Coyle RN  Outcome: Progressing     Problem: ABCDS Injury Assessment  Goal: Absence of physical injury  1/25/2025 1103 by Lavonne Coyle RN  Outcome: Progressing     Problem: Pain  Goal: Verbalizes/displays adequate comfort level or baseline comfort level  1/25/2025 1103 by Lavonne Coyle RN  Outcome: Progressing     
  Problem: Safety - Adult  Goal: Free from fall injury  Outcome: Progressing     Problem: Discharge Planning  Goal: Discharge to home or other facility with appropriate resources  Outcome: Progressing     Problem: Skin/Tissue Integrity  Goal: Skin integrity remains intact  Description: 1.  Monitor for areas of redness and/or skin breakdown  2.  Assess vascular access sites hourly  3.  Every 4-6 hours minimum:  Change oxygen saturation probe site  4.  Every 4-6 hours:  If on nasal continuous positive airway pressure, respiratory therapy assess nares and determine need for appliance change or resting period  Outcome: Progressing     Problem: ABCDS Injury Assessment  Goal: Absence of physical injury  Outcome: Progressing     Problem: Pain  Goal: Verbalizes/displays adequate comfort level or baseline comfort level  Outcome: Progressing     
SPEECH PATHOLOGY MODIFIED BARIUM SWALLOW STUDY  Patient: Donell Alston (69 y.o. male)  Date: 1/23/2025  Primary Diagnosis: Influenza [J11.1]       Precautions:                     ASSESSMENT :  Patient presents with mild oral dysphagia and mild-moderate pharyngeal dysphagia, related to patient's history of ALS. Oral deficits are characterized by mildly prolonged mastication of solids. Pharyngeal dysphagia is characterized by reduced tongue base retraction, decreased hyolaryngeal elevation/closure, resulting in reduced UES opening. This results in moderate-severe vallecular residue with consistencies of increased viscosity (i.e. puree and solids). Patient with several sequential swallows per bolus due to residue. Liquid wash was beneficial in reducing pharyngeal residue with puree trials, however following solids, a liquid wash was seen to move residue along posterior pharyngeal wall without clearance. Patient cued for an effortful swallow, which was effective in reducing residue. Penetration observed during the swallow as well as before the swallow when used as a liquid wash to the level of the laryngeal vestibule due to decreased hyolaryngeal elevation/closure. Note patient with a volitional throat clear in response to penetration. No aspiration visualized with any consistency trialed.     Suspect current swallow dysfunction is the result of patient's neurodegenerative diagnosis. Recommend Puree/Thin Liquid diet with general aspiration precautions outlined below. SLP to follow acutely to dysphagia and motor speech rehab.    Patient will benefit from skilled intervention to address the above impairments.     PLAN :  Recommendations and Planned Interventions:  Diet: Puree and thin liquids  -- Patient cleared for mixed consistency (i.e. soup), per SLP  -- Medication crushed in puree, if cleared by pharmacy  -- Upright for all PO  -- Assistance for all PO intake  -- Routine oral care 2-3x/day  -- Small, single sips   
Speech LAnguage Pathology TREATMENT    Patient: Donell Alston (69 y.o. male)  Date: 1/24/2025  Primary Diagnosis: Hypoxemia [R09.02]  Influenza [J11.1]  Influenza A [J10.1]       Precautions:                     ASSESSMENT :  Patient seen for dysphagia treatment and initiation of EMST on this date. Reviewed MBS results with patient and patient's wife in addition to swallow strategies as outlined below, to which they verbalized understanding. Patient continues with several swallows per bolus, consistent with instrumental imaging completed yesterday as patient observed with reduced tongue base retraction and decreased hyolaryngeal elevation/closure. When liquid wash provided after puree trial to assist with pharyngeal clearance, patient intermittently observed with a throat clear, which per MBS was related to patient being sensate to penetration. No aspiration observed following any consistency trialed. At this time, continue to recommend Puree/Thin Liquid diet.    EMST treatment initiated as it has been effective in strengthening weakened respiratory muscles, providing patients with ALS with improved breathing and swallowing capabilities. Given UE weakness, patient benefited from therapist or patient's wife holding EMST device. Threshold determined and minimal-moderate verbal cueing required initially, however patient demonstrated increased independence for completion of EMST reps as trials progressed. Nose plugs placed to decrease nasal emissions. Patient completed 5 sets of 5 repetitions. Discussed recommendation for completion of EMST trials daily and goal written on patient's whiteboard, to which they verbalized understanding.    Patient will benefit from skilled intervention to address the above impairments.     PLAN :  Recommendations and Planned Interventions:  Diet: Puree and thin liquids  -- Patient cleared for mixed consistency (i.e. soup), per SLP  -- Medication crushed in puree, if cleared by 
Speech Strategies:  -- Over-articulate (opening your mouth wider, using a large oral cavity, to over-enunciate the word)  -- Breath support (take a deep, diaphragmatic breath prior to speaking to increase vocal loudness)       Recommend next SLP session: ROSE DOLAN evaluation    Acute SLP Services: SLP Plan of Care: 3 times/week. Patient's rehabilitation potential is considered to be Fair.  Discharge Recommendations: Yes, recommend SLP treatment at next level of care     SUBJECTIVE:   Patient seen with patient's wife, Linda, at bedside.    OBJECTIVE:     Past Medical History:   Diagnosis Date    Arthritis     CAD (coronary artery disease)     Hypertension      Past Surgical History:   Procedure Laterality Date    ANKLE FRACTURE SURGERY Left 12/27/2023    LEFT ANKLE OPEN REDUCTION INTERNAL FIXATION performed by Haresh Farnsworth MD at [facility] MAIN OR    BACK SURGERY  2020    LUMBAR LAMINECTOMY    CARDIAC CATHETERIZATION      x5 stents    CERVICAL FUSION N/A 10/25/2023    ANTERIOR CERVICAL DISCECTOMY AND FUSION C4-5 AND C5-6 performed by Leonard Castillo MD at [facility] MAIN OR    COLONOSCOPY      ORTHOPEDIC SURGERY Left     FEMUR BROKE AND REPAIRED AS A CHILD    TONSILLECTOMY       Prior Level of Function/Home Situation:        Baseline Assessment:  Current Diet : Soft and Bite-Sized  Current Liquid Diet : Thin  Prior Dysphagia History: Patient and patient's wife report progressive swallow decline over the last couple of months, specifically worsening over the last two weeks. Patient describes feeling of choking, globus sensation, and difficulty with mastication. No prior hx of swallow imaging completed. Patient followed by OP SLP at [facility].       Cognitive and Communication Status:  Neurologic State: Alert  Orientation Level: Oriented x4  Cognition: Follows commands    Dysphagia:  Oral Assessment:  Oral Motor   Labial: Decreased rate  Dentition: Natural;Full  Oral Hygiene: Moist;Clean  Lingual: No impairment  Velum:

## 2025-01-27 NOTE — DISCHARGE SUMMARY
Discharge Summary    Name: Donell Alston  [id number]  YOB: 1955 (Age: 69 y.o.)   Date of Admission: 1/22/2025  Date of Discharge: 1/27/2025  Attending Physician: Tim Mcmahon MD    Discharge Diagnosis:   Acute hypoxic respiratory failure POA- resolved  Due to Influenza A infection POA- cont Tamiflu, robitussin DM  History of ALS  Coag neg Staph bacteremia POA- repeat Cx negative, no Abx needed  History of chronic dysphagia  History of PEG tube in place  Hypertension  History of CAD  Full code        Consultations:  IP CONSULT TO PHARMACY      Brief Admission History/Reason for Admission Per Jerardo Avina MD:   \" 69 y.o.  male with PMHx significant for  has a past medical history of Arthritis, CAD (coronary artery disease), and Hypertension. who presents with the above chief complaint.      We were asked to admit for work up and further evaluation.      Patient here accompanied by family who provide history.  States that history of history of ALS.  Notes that he follows with [facility] for this.  Endorses 3 days of recent shortness of breath as well as increasing nausea and frequent dry cough.  No fevers or chills.  No production to his cough\"    Brief Hospital Course by Main Problems:   Acute hypoxic respiratory failure 2/2 influenza A infection  Influenza A infection  History of ALS  -Currently requiring supplemental oxygen to maintain saturations  -Chest x-ray without evidence of superimposed infection  -Continue Tamiflu  -Zofran as needed as needed for nausea, did have episode of emesis with Tamiflu in the ED  -Mucinex scheduled for cough and congestion  -Pharmacy consult placed for his outpatient ALS medication which we do not carry  -Wean oxygen as tolerated     Coag neg Staph bacteremia POA- 1/2 tubes  -Blood cultures growing staph coagulase negative, 1 out of 2 tubes only, likely contaminant, repeat Blood cx neg >24 hrs now  -Patient is asymptomatic, no

## 2025-01-29 LAB
BACTERIA SPEC CULT: NORMAL
BACTERIA SPEC CULT: NORMAL
SERVICE CMNT-IMP: NORMAL
SERVICE CMNT-IMP: NORMAL

## 2025-02-18 ENCOUNTER — APPOINTMENT (OUTPATIENT)
Facility: HOSPITAL | Age: 70
DRG: 175 | End: 2025-02-18
Payer: MEDICARE

## 2025-02-18 ENCOUNTER — HOSPITAL ENCOUNTER (INPATIENT)
Facility: HOSPITAL | Age: 70
LOS: 4 days | Discharge: HOME HEALTH | DRG: 175 | End: 2025-02-22
Attending: EMERGENCY MEDICINE | Admitting: INTERNAL MEDICINE
Payer: MEDICARE

## 2025-02-18 DIAGNOSIS — J18.9 COMMUNITY ACQUIRED PNEUMONIA OF RIGHT LOWER LOBE OF LUNG: ICD-10-CM

## 2025-02-18 DIAGNOSIS — R06.02 SHORTNESS OF BREATH: ICD-10-CM

## 2025-02-18 DIAGNOSIS — I26.99 MULTIPLE PULMONARY EMBOLI (HCC): Primary | ICD-10-CM

## 2025-02-18 LAB
ALBUMIN SERPL-MCNC: 3.2 G/DL (ref 3.5–5)
ALBUMIN/GLOB SERPL: 0.7 (ref 1.1–2.2)
ALP SERPL-CCNC: 91 U/L (ref 45–117)
ALT SERPL-CCNC: 17 U/L (ref 12–78)
ANION GAP SERPL CALC-SCNC: 7 MMOL/L (ref 2–12)
AST SERPL-CCNC: 19 U/L (ref 15–37)
BASOPHILS # BLD: 0.03 K/UL (ref 0–0.1)
BASOPHILS NFR BLD: 0.3 % (ref 0–1)
BILIRUB SERPL-MCNC: 0.7 MG/DL (ref 0.2–1)
BUN SERPL-MCNC: 6 MG/DL (ref 6–20)
BUN/CREAT SERPL: 11 (ref 12–20)
CALCIUM SERPL-MCNC: 9.5 MG/DL (ref 8.5–10.1)
CHLORIDE SERPL-SCNC: 100 MMOL/L (ref 97–108)
CO2 SERPL-SCNC: 26 MMOL/L (ref 21–32)
COMMENT:: NORMAL
CREAT SERPL-MCNC: 0.53 MG/DL (ref 0.7–1.3)
CRP SERPL-MCNC: 20.9 MG/DL (ref 0–0.3)
DIFFERENTIAL METHOD BLD: ABNORMAL
ECHO BSA: 1.85 M2
EOSINOPHIL # BLD: 0.26 K/UL (ref 0–0.4)
EOSINOPHIL NFR BLD: 2.8 % (ref 0–7)
ERYTHROCYTE [DISTWIDTH] IN BLOOD BY AUTOMATED COUNT: 11.9 % (ref 11.5–14.5)
FLUAV RNA SPEC QL NAA+PROBE: NOT DETECTED
FLUBV RNA SPEC QL NAA+PROBE: NOT DETECTED
GLOBULIN SER CALC-MCNC: 4.5 G/DL (ref 2–4)
GLUCOSE SERPL-MCNC: 104 MG/DL (ref 65–100)
HCT VFR BLD AUTO: 36.9 % (ref 36.6–50.3)
HGB BLD-MCNC: 12.6 G/DL (ref 12.1–17)
IMM GRANULOCYTES # BLD AUTO: 0.02 K/UL (ref 0–0.04)
IMM GRANULOCYTES NFR BLD AUTO: 0.2 % (ref 0–0.5)
INR PPP: 1 (ref 0.9–1.1)
LACTATE BLD-SCNC: 1.31 MMOL/L (ref 0.4–2)
LYMPHOCYTES # BLD: 1.98 K/UL (ref 0.8–3.5)
LYMPHOCYTES NFR BLD: 21.6 % (ref 12–49)
MCH RBC QN AUTO: 31.8 PG (ref 26–34)
MCHC RBC AUTO-ENTMCNC: 34.1 G/DL (ref 30–36.5)
MCV RBC AUTO: 93.2 FL (ref 80–99)
MONOCYTES # BLD: 1.25 K/UL (ref 0–1)
MONOCYTES NFR BLD: 13.6 % (ref 5–13)
NEUTS SEG # BLD: 5.64 K/UL (ref 1.8–8)
NEUTS SEG NFR BLD: 61.5 % (ref 32–75)
NRBC # BLD: 0 K/UL (ref 0–0.01)
NRBC BLD-RTO: 0 PER 100 WBC
NT PRO BNP: 165 PG/ML
PLATELET # BLD AUTO: 355 K/UL (ref 150–400)
PMV BLD AUTO: 8.8 FL (ref 8.9–12.9)
POTASSIUM SERPL-SCNC: 4 MMOL/L (ref 3.5–5.1)
PROCALCITONIN SERPL-MCNC: 0.05 NG/ML
PROT SERPL-MCNC: 7.7 G/DL (ref 6.4–8.2)
PROTHROMBIN TIME: 10.9 SEC (ref 9.2–11.2)
RBC # BLD AUTO: 3.96 M/UL (ref 4.1–5.7)
SARS-COV-2 RNA RESP QL NAA+PROBE: NOT DETECTED
SODIUM SERPL-SCNC: 133 MMOL/L (ref 136–145)
SOURCE: NORMAL
SPECIMEN HOLD: NORMAL
TROPONIN I SERPL HS-MCNC: <4 NG/L (ref 0–76)
WBC # BLD AUTO: 9.2 K/UL (ref 4.1–11.1)

## 2025-02-18 PROCEDURE — 87899 AGENT NOS ASSAY W/OPTIC: CPT

## 2025-02-18 PROCEDURE — 96372 THER/PROPH/DIAG INJ SC/IM: CPT

## 2025-02-18 PROCEDURE — 85610 PROTHROMBIN TIME: CPT

## 2025-02-18 PROCEDURE — 71275 CT ANGIOGRAPHY CHEST: CPT

## 2025-02-18 PROCEDURE — 6360000004 HC RX CONTRAST MEDICATION: Performed by: PHYSICIAN ASSISTANT

## 2025-02-18 PROCEDURE — 36415 COLL VENOUS BLD VENIPUNCTURE: CPT

## 2025-02-18 PROCEDURE — 6360000002 HC RX W HCPCS: Performed by: PHYSICIAN ASSISTANT

## 2025-02-18 PROCEDURE — 84145 PROCALCITONIN (PCT): CPT

## 2025-02-18 PROCEDURE — 93970 EXTREMITY STUDY: CPT

## 2025-02-18 PROCEDURE — 99285 EMERGENCY DEPT VISIT HI MDM: CPT

## 2025-02-18 PROCEDURE — 2700000000 HC OXYGEN THERAPY PER DAY

## 2025-02-18 PROCEDURE — 2580000003 HC RX 258: Performed by: PHYSICIAN ASSISTANT

## 2025-02-18 PROCEDURE — 94640 AIRWAY INHALATION TREATMENT: CPT

## 2025-02-18 PROCEDURE — 83605 ASSAY OF LACTIC ACID: CPT

## 2025-02-18 PROCEDURE — 1100000000 HC RM PRIVATE

## 2025-02-18 PROCEDURE — 2500000003 HC RX 250 WO HCPCS: Performed by: PHYSICIAN ASSISTANT

## 2025-02-18 PROCEDURE — 71046 X-RAY EXAM CHEST 2 VIEWS: CPT

## 2025-02-18 PROCEDURE — 6370000000 HC RX 637 (ALT 250 FOR IP): Performed by: INTERNAL MEDICINE

## 2025-02-18 PROCEDURE — 2500000003 HC RX 250 WO HCPCS: Performed by: INTERNAL MEDICINE

## 2025-02-18 PROCEDURE — 85025 COMPLETE CBC W/AUTO DIFF WBC: CPT

## 2025-02-18 PROCEDURE — 93005 ELECTROCARDIOGRAM TRACING: CPT

## 2025-02-18 PROCEDURE — 86140 C-REACTIVE PROTEIN: CPT

## 2025-02-18 PROCEDURE — 96365 THER/PROPH/DIAG IV INF INIT: CPT

## 2025-02-18 PROCEDURE — 80053 COMPREHEN METABOLIC PANEL: CPT

## 2025-02-18 PROCEDURE — 6370000000 HC RX 637 (ALT 250 FOR IP): Performed by: STUDENT IN AN ORGANIZED HEALTH CARE EDUCATION/TRAINING PROGRAM

## 2025-02-18 PROCEDURE — 84484 ASSAY OF TROPONIN QUANT: CPT

## 2025-02-18 PROCEDURE — 6360000002 HC RX W HCPCS: Performed by: INTERNAL MEDICINE

## 2025-02-18 PROCEDURE — 2500000003 HC RX 250 WO HCPCS: Performed by: HOSPITALIST

## 2025-02-18 PROCEDURE — 96375 TX/PRO/DX INJ NEW DRUG ADDON: CPT

## 2025-02-18 PROCEDURE — 83880 ASSAY OF NATRIURETIC PEPTIDE: CPT

## 2025-02-18 PROCEDURE — 87636 SARSCOV2 & INF A&B AMP PRB: CPT

## 2025-02-18 PROCEDURE — 71045 X-RAY EXAM CHEST 1 VIEW: CPT

## 2025-02-18 RX ORDER — ATENOLOL 50 MG/1
50 TABLET ORAL DAILY
Status: DISCONTINUED | OUTPATIENT
Start: 2025-02-19 | End: 2025-02-22 | Stop reason: HOSPADM

## 2025-02-18 RX ORDER — ENOXAPARIN SODIUM 100 MG/ML
1 INJECTION SUBCUTANEOUS 2 TIMES DAILY
Status: DISCONTINUED | OUTPATIENT
Start: 2025-02-18 | End: 2025-02-20

## 2025-02-18 RX ORDER — ACETAMINOPHEN 650 MG/1
650 SUPPOSITORY RECTAL EVERY 6 HOURS PRN
Status: DISCONTINUED | OUTPATIENT
Start: 2025-02-18 | End: 2025-02-22 | Stop reason: HOSPADM

## 2025-02-18 RX ORDER — GUAIFENESIN 600 MG/1
600 TABLET, EXTENDED RELEASE ORAL 2 TIMES DAILY
Status: DISCONTINUED | OUTPATIENT
Start: 2025-02-18 | End: 2025-02-18

## 2025-02-18 RX ORDER — MAGNESIUM HYDROXIDE/ALUMINUM HYDROXICE/SIMETHICONE 120; 1200; 1200 MG/30ML; MG/30ML; MG/30ML
30 SUSPENSION ORAL EVERY 6 HOURS PRN
Status: DISCONTINUED | OUTPATIENT
Start: 2025-02-18 | End: 2025-02-22 | Stop reason: HOSPADM

## 2025-02-18 RX ORDER — ISOSORBIDE DINITRATE 10 MG/1
10 TABLET ORAL 3 TIMES DAILY
Status: DISCONTINUED | OUTPATIENT
Start: 2025-02-18 | End: 2025-02-22 | Stop reason: HOSPADM

## 2025-02-18 RX ORDER — SODIUM CHLORIDE 0.9 % (FLUSH) 0.9 %
5-40 SYRINGE (ML) INJECTION EVERY 12 HOURS SCHEDULED
Status: DISCONTINUED | OUTPATIENT
Start: 2025-02-18 | End: 2025-02-22 | Stop reason: HOSPADM

## 2025-02-18 RX ORDER — ATENOLOL 25 MG/1
50 TABLET ORAL DAILY
Status: DISCONTINUED | OUTPATIENT
Start: 2025-02-18 | End: 2025-02-18

## 2025-02-18 RX ORDER — AMLODIPINE BESYLATE 5 MG/1
5 TABLET ORAL DAILY
Status: DISCONTINUED | OUTPATIENT
Start: 2025-02-19 | End: 2025-02-22 | Stop reason: HOSPADM

## 2025-02-18 RX ORDER — IOPAMIDOL 755 MG/ML
100 INJECTION, SOLUTION INTRAVASCULAR
Status: COMPLETED | OUTPATIENT
Start: 2025-02-18 | End: 2025-02-18

## 2025-02-18 RX ORDER — NICOTINE 21 MG/24HR
1 PATCH, TRANSDERMAL 24 HOURS TRANSDERMAL DAILY
Status: DISCONTINUED | OUTPATIENT
Start: 2025-02-18 | End: 2025-02-18

## 2025-02-18 RX ORDER — ALBUTEROL SULFATE 0.83 MG/ML
2.5 SOLUTION RESPIRATORY (INHALATION) EVERY 6 HOURS PRN
Status: DISCONTINUED | OUTPATIENT
Start: 2025-02-18 | End: 2025-02-22 | Stop reason: HOSPADM

## 2025-02-18 RX ORDER — IPRATROPIUM BROMIDE AND ALBUTEROL SULFATE 2.5; .5 MG/3ML; MG/3ML
1 SOLUTION RESPIRATORY (INHALATION) EVERY 4 HOURS PRN
Status: DISCONTINUED | OUTPATIENT
Start: 2025-02-18 | End: 2025-02-22 | Stop reason: HOSPADM

## 2025-02-18 RX ORDER — ONDANSETRON 2 MG/ML
4 INJECTION INTRAMUSCULAR; INTRAVENOUS EVERY 6 HOURS PRN
Status: DISCONTINUED | OUTPATIENT
Start: 2025-02-18 | End: 2025-02-22 | Stop reason: HOSPADM

## 2025-02-18 RX ORDER — NITROGLYCERIN 0.4 MG/1
0.4 TABLET SUBLINGUAL EVERY 5 MIN PRN
Status: DISCONTINUED | OUTPATIENT
Start: 2025-02-18 | End: 2025-02-22 | Stop reason: HOSPADM

## 2025-02-18 RX ORDER — ALBUTEROL SULFATE 0.83 MG/ML
2.5 SOLUTION RESPIRATORY (INHALATION)
Status: DISCONTINUED | OUTPATIENT
Start: 2025-02-18 | End: 2025-02-22 | Stop reason: HOSPADM

## 2025-02-18 RX ORDER — MORPHINE SULFATE 4 MG/ML
4 INJECTION, SOLUTION INTRAMUSCULAR; INTRAVENOUS EVERY 4 HOURS PRN
Status: DISCONTINUED | OUTPATIENT
Start: 2025-02-18 | End: 2025-02-20

## 2025-02-18 RX ORDER — ISOSORBIDE DINITRATE 10 MG/1
10 TABLET ORAL 3 TIMES DAILY
COMMUNITY

## 2025-02-18 RX ORDER — BACLOFEN 10 MG/1
10 TABLET ORAL 2 TIMES DAILY PRN
Status: DISCONTINUED | OUTPATIENT
Start: 2025-02-18 | End: 2025-02-22 | Stop reason: HOSPADM

## 2025-02-18 RX ORDER — POLYETHYLENE GLYCOL 3350 17 G/17G
17 POWDER, FOR SOLUTION ORAL DAILY PRN
Status: DISCONTINUED | OUTPATIENT
Start: 2025-02-18 | End: 2025-02-22 | Stop reason: HOSPADM

## 2025-02-18 RX ORDER — GUAIFENESIN 200 MG/10ML
400 LIQUID ORAL 2 TIMES DAILY
Status: DISCONTINUED | OUTPATIENT
Start: 2025-02-18 | End: 2025-02-22 | Stop reason: HOSPADM

## 2025-02-18 RX ORDER — ATORVASTATIN CALCIUM 40 MG/1
40 TABLET, FILM COATED ORAL DAILY
Status: DISCONTINUED | OUTPATIENT
Start: 2025-02-19 | End: 2025-02-22 | Stop reason: HOSPADM

## 2025-02-18 RX ORDER — RILUZOLE 50 MG/1
50 TABLET, FILM COATED ORAL EVERY 12 HOURS
Status: DISCONTINUED | OUTPATIENT
Start: 2025-02-18 | End: 2025-02-22 | Stop reason: HOSPADM

## 2025-02-18 RX ORDER — LISINOPRIL 5 MG/1
5 TABLET ORAL DAILY
Status: DISCONTINUED | OUTPATIENT
Start: 2025-02-18 | End: 2025-02-18

## 2025-02-18 RX ORDER — SODIUM CHLORIDE 9 MG/ML
INJECTION, SOLUTION INTRAVENOUS PRN
Status: DISCONTINUED | OUTPATIENT
Start: 2025-02-18 | End: 2025-02-22 | Stop reason: HOSPADM

## 2025-02-18 RX ORDER — MORPHINE SULFATE 4 MG/ML
4 INJECTION, SOLUTION INTRAMUSCULAR; INTRAVENOUS
Status: COMPLETED | OUTPATIENT
Start: 2025-02-18 | End: 2025-02-18

## 2025-02-18 RX ORDER — AMLODIPINE BESYLATE 5 MG/1
5 TABLET ORAL DAILY
Status: DISCONTINUED | OUTPATIENT
Start: 2025-02-18 | End: 2025-02-18

## 2025-02-18 RX ORDER — ATORVASTATIN CALCIUM 40 MG/1
40 TABLET, FILM COATED ORAL DAILY
Status: DISCONTINUED | OUTPATIENT
Start: 2025-02-18 | End: 2025-02-18

## 2025-02-18 RX ORDER — SODIUM CHLORIDE 0.9 % (FLUSH) 0.9 %
5-40 SYRINGE (ML) INJECTION PRN
Status: DISCONTINUED | OUTPATIENT
Start: 2025-02-18 | End: 2025-02-22 | Stop reason: HOSPADM

## 2025-02-18 RX ORDER — BACLOFEN 10 MG/1
5 TABLET ORAL 3 TIMES DAILY PRN
Status: DISCONTINUED | OUTPATIENT
Start: 2025-02-18 | End: 2025-02-18

## 2025-02-18 RX ORDER — ONDANSETRON 4 MG/1
4 TABLET, ORALLY DISINTEGRATING ORAL EVERY 8 HOURS PRN
Status: DISCONTINUED | OUTPATIENT
Start: 2025-02-18 | End: 2025-02-22 | Stop reason: HOSPADM

## 2025-02-18 RX ORDER — ISOSORBIDE DINITRATE 10 MG/1
10 TABLET ORAL 3 TIMES DAILY
Status: DISCONTINUED | OUTPATIENT
Start: 2025-02-18 | End: 2025-02-18

## 2025-02-18 RX ORDER — ACETAMINOPHEN 325 MG/1
650 TABLET ORAL EVERY 6 HOURS PRN
Status: DISCONTINUED | OUTPATIENT
Start: 2025-02-18 | End: 2025-02-22 | Stop reason: HOSPADM

## 2025-02-18 RX ORDER — ISOSORBIDE MONONITRATE 30 MG/1
30 TABLET, EXTENDED RELEASE ORAL EVERY MORNING
Status: DISCONTINUED | OUTPATIENT
Start: 2025-02-18 | End: 2025-02-18

## 2025-02-18 RX ORDER — HYDROXYZINE HYDROCHLORIDE 10 MG/1
10 TABLET, FILM COATED ORAL 3 TIMES DAILY PRN
Status: DISCONTINUED | OUTPATIENT
Start: 2025-02-18 | End: 2025-02-22 | Stop reason: HOSPADM

## 2025-02-18 RX ORDER — BACLOFEN 10 MG/1
10 TABLET ORAL 2 TIMES DAILY PRN
COMMUNITY
Start: 2024-12-30

## 2025-02-18 RX ORDER — BENZONATATE 100 MG/1
100 CAPSULE ORAL 3 TIMES DAILY PRN
Status: DISCONTINUED | OUTPATIENT
Start: 2025-02-18 | End: 2025-02-22 | Stop reason: HOSPADM

## 2025-02-18 RX ORDER — RANOLAZINE 500 MG/1
500 TABLET, EXTENDED RELEASE ORAL 2 TIMES DAILY
Status: DISCONTINUED | OUTPATIENT
Start: 2025-02-18 | End: 2025-02-18

## 2025-02-18 RX ADMIN — SODIUM CHLORIDE, PRESERVATIVE FREE 10 ML: 5 INJECTION INTRAVENOUS at 22:53

## 2025-02-18 RX ADMIN — BACLOFEN 10 MG: 10 TABLET ORAL at 22:52

## 2025-02-18 RX ADMIN — GUAIFENESIN 400 MG: 200 SOLUTION ORAL at 22:52

## 2025-02-18 RX ADMIN — BACLOFEN 5 MG: 10 TABLET ORAL at 15:19

## 2025-02-18 RX ADMIN — RILUZOLE 50 MG: 50 TABLET, FILM COATED ORAL at 22:48

## 2025-02-18 RX ADMIN — ALBUTEROL SULFATE 2.5 MG: 2.5 SOLUTION RESPIRATORY (INHALATION) at 19:46

## 2025-02-18 RX ADMIN — Medication 3 MG: at 22:52

## 2025-02-18 RX ADMIN — ISOSORBIDE DINITRATE 10 MG: 10 TABLET ORAL at 22:56

## 2025-02-18 RX ADMIN — MORPHINE SULFATE 4 MG: 4 INJECTION, SOLUTION INTRAMUSCULAR; INTRAVENOUS at 10:47

## 2025-02-18 RX ADMIN — AZITHROMYCIN MONOHYDRATE 500 MG: 500 INJECTION, POWDER, LYOPHILIZED, FOR SOLUTION INTRAVENOUS at 10:38

## 2025-02-18 RX ADMIN — MORPHINE SULFATE 4 MG: 4 INJECTION, SOLUTION INTRAMUSCULAR; INTRAVENOUS at 15:18

## 2025-02-18 RX ADMIN — MORPHINE SULFATE 4 MG: 4 INJECTION, SOLUTION INTRAMUSCULAR; INTRAVENOUS at 22:48

## 2025-02-18 RX ADMIN — IOPAMIDOL 100 ML: 755 INJECTION, SOLUTION INTRAVENOUS at 09:31

## 2025-02-18 RX ADMIN — ISOSORBIDE DINITRATE 10 MG: 10 TABLET ORAL at 15:30

## 2025-02-18 RX ADMIN — WATER 1000 MG: 1 INJECTION INTRAMUSCULAR; INTRAVENOUS; SUBCUTANEOUS at 10:28

## 2025-02-18 RX ADMIN — ENOXAPARIN SODIUM 70 MG: 100 INJECTION SUBCUTANEOUS at 11:21

## 2025-02-18 RX ADMIN — ENOXAPARIN SODIUM 70 MG: 100 INJECTION SUBCUTANEOUS at 22:49

## 2025-02-18 ASSESSMENT — ENCOUNTER SYMPTOMS: COUGH: 1

## 2025-02-18 ASSESSMENT — PAIN DESCRIPTION - FREQUENCY: FREQUENCY: INTERMITTENT

## 2025-02-18 ASSESSMENT — LIFESTYLE VARIABLES
HOW MANY STANDARD DRINKS CONTAINING ALCOHOL DO YOU HAVE ON A TYPICAL DAY: 3 OR 4
HOW OFTEN DO YOU HAVE A DRINK CONTAINING ALCOHOL: 4 OR MORE TIMES A WEEK

## 2025-02-18 ASSESSMENT — PAIN DESCRIPTION - PAIN TYPE: TYPE: ACUTE PAIN

## 2025-02-18 ASSESSMENT — PAIN DESCRIPTION - DESCRIPTORS
DESCRIPTORS: ACHING
DESCRIPTORS: SPASM;CRAMPING

## 2025-02-18 ASSESSMENT — PAIN SCALES - GENERAL
PAINLEVEL_OUTOF10: 5
PAINLEVEL_OUTOF10: 2
PAINLEVEL_OUTOF10: 2
PAINLEVEL_OUTOF10: 8
PAINLEVEL_OUTOF10: 4
PAINLEVEL_OUTOF10: 9
PAINLEVEL_OUTOF10: 0
PAINLEVEL_OUTOF10: 9

## 2025-02-18 ASSESSMENT — PAIN DESCRIPTION - ORIENTATION
ORIENTATION: POSTERIOR
ORIENTATION: LOWER

## 2025-02-18 ASSESSMENT — PAIN DESCRIPTION - ONSET: ONSET: ON-GOING

## 2025-02-18 ASSESSMENT — PAIN DESCRIPTION - LOCATION
LOCATION: BACK
LOCATION: BACK

## 2025-02-18 NOTE — ED NOTES
Report given to ALEXEI Clark (oncoming nurse) by ALEXEI Hicks (offgoing nurse). Report included the following information Nurse Handoff Report, ED Encounter Summary, ED SBAR, and MAR.

## 2025-02-18 NOTE — CONSULTS
Consult received. Full consult note to follow.      Nataly Zhao MD  Pulmonology  [place], [place]  [phone]  2/18/2025

## 2025-02-18 NOTE — ED NOTES
Santiago Served MD [place] the following:    \"Can you update orders to all be pills that are able to be crushed for peg tube? Pt wife advised pt imdur is 10mg TID and ranexa is given in powder form as aspruzyo 500 mg 1 pack BID. Pt also no longer uses nicotine patch.\"    Per provider:    \"2/18/25 1:03 PM  Please let pharmacy know about it. They can probably help guide in this matter, thank you.  2/18/25 1:03 PM  i will DC nicotine patch.\"

## 2025-02-18 NOTE — ED PROVIDER NOTES
Face-To-Face Shared Encounter with an MANOLO's Patient:      The patient presents with 69-year-old male presenting with history of ALS, CAD, arthritis presenting with complaints of cough and back pain and shortness of breath.  Patient is intermittently hypoxic with oxygen saturations below 90%.  My exam shows patient is lethargic, weak diffuse and globally, not tachycardic or hypotensive.  His lungs are demonstrating coarse breath sounds.  No significant tachypnea.  Impression/Plan: Patient found to have multiple pulmonary emboli, right lower lobe pneumonia, signs of hypoxia.  Evaluating for signs of endorgan damage but no signs of right heart strain on CT scan.  Started on Lovenox, will admit for further workup and management, broad-spectrum antibiotics initiated for pneumonia.    Critical Care: I independently provided 40 minutes of non-concurrent critical care out of the total shared critical care time provided. This time excludes time spent in any separate billed procedures.  During this entire length of time I was immediately available to the patient .    I personally saw the patient and made/approved the management plan and take responsibility for the patient management.    For further details of Donell Alston's emergency department encounter, please see documentation by advanced practice provider, Buster Quinonez.    MD Igor Coppola Christopher B, MD  02/18/25 2208

## 2025-02-18 NOTE — ED PROVIDER NOTES
mouth daily    EDARAVONE (RADICAVA ORS) 105 MG/5ML SUSP    Take 5 mLs by mouth daily For 10 days then off for 18 days repeat    HYDROXYZINE HCL (ATARAX) 10 MG TABLET    Take 1 tablet by mouth 3 times daily as needed for Anxiety    ISOSORBIDE DINITRATE (ISORDIL) 10 MG TABLET    Take 1 tablet by mouth 3 times daily    NITROGLYCERIN (NITROSTAT) 0.4 MG SL TABLET    Place 1 tablet under the tongue every 5 minutes as needed for Chest pain up to max of 3 total doses. If no relief after 1 dose, call 911.    RILUZOLE (RILUTEK) 50 MG TABLET    Take 1 tablet by mouth in the morning and 1 tablet in the evening.       SCREENINGS               No data recorded        PHYSICAL EXAM      ED Triage Vitals [02/18/25 0803]   Encounter Vitals Group      BP (!) 118/55      Systolic BP Percentile       Diastolic BP Percentile       Pulse 66      Respirations 12      Temp 97.9 °F (36.6 °C)      Temp src       SpO2 94 %      Weight       Height       Head Circumference       Peak Flow       Pain Score       Pain Loc       Pain Education       Exclude from Growth Chart         Physical Exam  Vitals and nursing note reviewed.   Constitutional:       Comments: Stigmata of ALS; friendly; good historian; no distress   HENT:      Head: Normocephalic and atraumatic.      Nose: Nose normal.      Mouth/Throat:      Mouth: Mucous membranes are moist.   Eyes:      Pupils: Pupils are equal, round, and reactive to light.   Cardiovascular:      Rate and Rhythm: Normal rate.      Comments:   No lower extremity edema  Symmetric upper and lower extremity distal pulses  Pulmonary:      Effort: Pulmonary effort is normal.      Breath sounds: No wheezing.      Comments:   Breathing is unlabored and lungs are clear to auscultation throughout  Oxygen saturation hovers in the low 90s with good pleth  Musculoskeletal:      Lumbar back: No bony tenderness.        Back:       Comments:   Muscular discomfort of the lower back is mild with palpation  No midline bony  tenderness  No CVA tenderness   Neurological:      Mental Status: He is alert.          DIAGNOSTIC RESULTS   LABS:     Recent Results (from the past 24 hour(s))   CBC with Auto Differential    Collection Time: 02/18/25  8:22 AM   Result Value Ref Range    WBC 9.2 4.1 - 11.1 K/uL    RBC 3.96 (L) 4.10 - 5.70 M/uL    Hemoglobin 12.6 12.1 - 17.0 g/dL    Hematocrit 36.9 36.6 - 50.3 %    MCV 93.2 80.0 - 99.0 FL    MCH 31.8 26.0 - 34.0 PG    MCHC 34.1 30.0 - 36.5 g/dL    RDW 11.9 11.5 - 14.5 %    Platelets 355 150 - 400 K/uL    MPV 8.8 (L) 8.9 - 12.9 FL    Nucleated RBCs 0.0 0  WBC    nRBC 0.00 0.00 - 0.01 K/uL    Neutrophils % 61.5 32.0 - 75.0 %    Lymphocytes % 21.6 12.0 - 49.0 %    Monocytes % 13.6 (H) 5.0 - 13.0 %    Eosinophils % 2.8 0.0 - 7.0 %    Basophils % 0.3 0.0 - 1.0 %    Immature Granulocytes % 0.2 0.0 - 0.5 %    Neutrophils Absolute 5.64 1.80 - 8.00 K/UL    Lymphocytes Absolute 1.98 0.80 - 3.50 K/UL    Monocytes Absolute 1.25 (H) 0.00 - 1.00 K/UL    Eosinophils Absolute 0.26 0.00 - 0.40 K/UL    Basophils Absolute 0.03 0.00 - 0.10 K/UL    Immature Granulocytes Absolute 0.02 0.00 - 0.04 K/UL    Differential Type AUTOMATED     Comprehensive Metabolic Panel    Collection Time: 02/18/25  8:22 AM   Result Value Ref Range    Sodium 133 (L) 136 - 145 mmol/L    Potassium 4.0 3.5 - 5.1 mmol/L    Chloride 100 97 - 108 mmol/L    CO2 26 21 - 32 mmol/L    Anion Gap 7 2 - 12 mmol/L    Glucose 104 (H) 65 - 100 mg/dL    BUN 6 6 - 20 MG/DL    Creatinine 0.53 (L) 0.70 - 1.30 MG/DL    BUN/Creatinine Ratio 11 (L) 12 - 20      Est, Glom Filt Rate >90 >60 ml/min/1.73m2    Calcium 9.5 8.5 - 10.1 MG/DL    Total Bilirubin 0.7 0.2 - 1.0 MG/DL    ALT 17 12 - 78 U/L    AST 19 15 - 37 U/L    Alk Phosphatase 91 45 - 117 U/L    Total Protein 7.7 6.4 - 8.2 g/dL    Albumin 3.2 (L) 3.5 - 5.0 g/dL    Globulin 4.5 (H) 2.0 - 4.0 g/dL    Albumin/Globulin Ratio 0.7 (L) 1.1 - 2.2     Protime-INR    Collection Time: 02/18/25  8:22 AM   Result

## 2025-02-18 NOTE — ED NOTES
Messaged pharmacy the following:    \"Can you update orders to all be pills that are able to be crushed for peg tube? Pt wife advised pt imdur is 10mg TID and ranexa is given in powder form as aspruzyo 500 mg 1 pack BID.\"   Same Histology In Subsequent Stages Text: The pattern and morphology of the tumor is as described in the first stage.

## 2025-02-18 NOTE — PROGRESS NOTES
Pharmacy Medication History Note    The patient was interviewed regarding current PTA medication list, use and drug allergies;  patient present in room and obtained permission from patient to discuss drug regimen with visitor(s) present. The patient was questioned regarding use of any other inhalers, topical products, over the counter medications, herbal medications, vitamin products or ophthalmic/nasal/otic medication use.     Allergy Update: Patient has no known allergies.    Recommendations/Findings:   The following amendments were made to the patient's active medication list on file at [facility]:   1) Additions:   -isosorbide dinitratre 10mg tid  2) Deletions:   -imdur 30mg ER  -lisinopril  -ranolazine  3) Changes: none  Pertinent Findings:   -patient home supplied edaravone and rilutek    Clarified PTA med list with medication list. ,patient's wife. PTA medication list was corrected to the following:     Prior to Admission Medications   Prescriptions Last Dose Informant   Edaravone (RADICAVA ORS) 105 MG/5ML SUSP 2/18/2025 Spouse/Significant Other   Sig: Take 5 mLs by mouth daily For 10 days then off for 18 days repeat   acetaminophen (TYLENOL) 500 MG tablet  Spouse/Significant Other   Sig: Take 2 tablets by mouth every 4 hours as needed for Pain   amLODIPine (NORVASC) 5 MG tablet 2/18/2025 Self, Spouse/Significant Other   Sig: Take 1 tablet by mouth daily   aspirin 81 MG EC tablet 2/18/2025 Spouse/Significant Other   Sig: Take 1 tablet by mouth in the morning and at bedtime   Patient taking differently: Take 1 tablet by mouth daily   atenolol (TENORMIN) 50 MG tablet 2/18/2025 Self, Spouse/Significant Other   Sig: Take 0.5 tablets by mouth daily   atorvastatin (LIPITOR) 40 MG tablet 2/18/2025 Self, Spouse/Significant Other   Sig: Take 1 tablet by mouth daily   baclofen (LIORESAL) 10 MG tablet 2/18/2025 Spouse/Significant Other   Sig: Take 0.5 tablets by mouth 3 times daily as needed   coenzyme Q10 100 MG CAPS capsule

## 2025-02-18 NOTE — CONSULTS
Pulmonary Consult Note            Name: Donell Alston   : 1955   MRN: [id number]   Date: 2025      Reason for Consult: Pneumonia, hypoxia and pulmonary embolism    Requesting Provider: Dr Sherrie Haywood    Mode of visit - In person       HPI:     Donell Alston is 69 y.o. gentleman with history of ALS, on home ventilator, chronic dysphagia, PEG tube placement, hypertension, CAD who had admission here last month with acute hypoxic respiratory failure and influenza A.  He improved with Tamiflu and supportive therapy and was discharged home 3 weeks ago.  He was admitted here today with hemoptysis.  As per the picture wife showed, he coughed up dark red blood x 1 which is about the size of a dollar.  Right-sided segmental and subsegmental pulmonary emboli with right lower lobe infiltrate and small pleural effusion on chest CT as per radiology.      With regard to ALS, patient was apparently diagnosed with this in last summer, he is using riluzole and edaravone through [facility] neurology.  States his mobility at home very limited due to muscle weakness. Can transfer with assistance.  Admits mild chronic cough with some sputum, attributes it to ALS.  History of dysphagia, denies any recent abscess aspiration. Of note, SLP cleared him for puréed diet with thin liquids at last admission. Patient sees pulmonology at [facility] as per wife.  Apparently he was started on home ventilator in November and he is using it intermittently.  Denies home O2.  Denies prior lung disease.  He used to smoke heavily up until age 49, currently smokes intermittently.  There is no prior history or family history of VTE.    Patient is afebrile and hemodynamically stable.  Pulse ox earlier was 80s on room air, he currently saturating 90% on 2 L.  He is getting Rocephin, azithromycin and therapeutic dose Lovenox.    Labs show unremarkable CBC and mild hyponatremia    COVID and influenza swabs negative in this admission, influenza A PCR  Range    Troponin, High Sensitivity <4 0 - 76 ng/L   Brain Natriuretic Peptide    Collection Time: 02/18/25 10:05 AM   Result Value Ref Range    NT Pro- (H) <125 PG/ML   POC Lactic Acid    Collection Time: 02/18/25 10:39 AM   Result Value Ref Range    POC Lactic Acid 1.31 0.40 - 2.00 mmol/L   Vascular duplex lower extremity venous bilateral    Collection Time: 02/18/25 10:55 AM   Result Value Ref Range    Body Surface Area 1.85 m2   EKG 12 Lead    Collection Time: 02/18/25 12:15 PM   Result Value Ref Range    Ventricular Rate 66 BPM    Atrial Rate 66 BPM    P-R Interval 174 ms    QRS Duration 86 ms    Q-T Interval 454 ms    QTc Calculation (Bazett) 475 ms    P Axis 64 degrees    R Axis 56 degrees    T Axis 48 degrees    Diagnosis       Normal sinus rhythm  Nonspecific ST and T wave abnormality  Prolonged QT  Abnormal ECG  When compared with ECG of 22-JAN-2025 22:23,  Nonspecific T wave abnormality now evident in Anterior leads     COVID-19 & Influenza Combo    Collection Time: 02/18/25 12:26 PM    Specimen: Nasopharyngeal   Result Value Ref Range    Source Nasopharyngeal      SARS-CoV-2, PCR Not detected NOTD      Rapid Influenza A By PCR Not detected NOTD      Rapid Influenza B By PCR Not detected NOTD           Echocardiogram:    No results found for this or any previous visit.      Imaging:    XR CHEST PORTABLE 02/18/2025    Narrative  INDICATION: Coughing, episode of blood.    Portable AP view of the chest.    Direct comparison made to prior chest x-ray dated 1/2025.    Cardiomediastinal silhouette is stable. There is mild right basilar patchy air  space disease. Left lung is clear. No pleural fluid is seen. There is no  pneumothorax.    Impression  Mild right basilar patchy air space disease.    Electronically signed by Jensen Hahn MD      CTA CHEST W WO CONTRAST 02/18/2025    Narrative  EXAM:  CTA CHEST W WO CONTRAST    INDICATION: Hemoptysis, hypoxemia, concern for PE.    COMPARISON: Chest x-ray

## 2025-02-18 NOTE — H&P
Hospitalist Admission Note    NAME:   Donell Alston   : 1955   MRN: [id number]     Date/Time: 2025 12:01 PM    Patient PCP: Jesse Morales MD    ______________________________________________________________________  Given the patient's current clinical presentation, I have a high level of concern for decompensation if discharged from the emergency department.  Complex decision making was performed, which includes reviewing the patient's available past medical records, laboratory results, and x-ray films.       My assessment of this patient's clinical condition and my plan of care is as follows.    Assessment / Plan:  Acute pulmonary embolism  CT angiogram pulmonary shows segmental and subsegmental pulmonary embolus right upper lobe and right lower lobe. No definite right heart strain. Right lower lobe infiltrate and small pleural effusion.  Monitor patient in telemetry.  Continue with full dose Lovenox.  Doppler lower extremity was done and report is pending.  Troponin was negative.  Will get echocardiogram to assess heart function.  Patient will need to be transition to oral anticoagulation on discharge.  Will consult oncology for further input.    RLL pneumonia  Continue with Rocephin and azithromycin.  Incentive spirometer.  Follow-up with CBC, blood culture, lactic acid.  DuoNeb, albuterol, Mucinex, Tessalon Perles.  Will consult pulmonology for further input.    Acute hypoxic respiratory failure  Patient is currently on 2 L of oxygen which is new for the patient.  Secondary to pulmonary embolism and pneumonia.  Will try to wean off oxygen as tolerated.    CAD  HTN  HLD  Continue with amlodipine, atenolol, Lipitor, isosorbide dinitrate.    ALS  Continue with riluzole and edaravone.  Patient follows up with [facility] outpatient.      Medical Decision Making:   I personally reviewed labs: CBC, BMP, LFT, troponin, INR, COVID test, flu test  I personally reviewed imaging: EKG, chest x-ray, CT  Monocytes % 13.6 (H) 5.0 - 13.0 %    Eosinophils % 2.8 0.0 - 7.0 %    Basophils % 0.3 0.0 - 1.0 %    Immature Granulocytes % 0.2 0.0 - 0.5 %    Neutrophils Absolute 5.64 1.80 - 8.00 K/UL    Lymphocytes Absolute 1.98 0.80 - 3.50 K/UL    Monocytes Absolute 1.25 (H) 0.00 - 1.00 K/UL    Eosinophils Absolute 0.26 0.00 - 0.40 K/UL    Basophils Absolute 0.03 0.00 - 0.10 K/UL    Immature Granulocytes Absolute 0.02 0.00 - 0.04 K/UL    Differential Type AUTOMATED     Comprehensive Metabolic Panel    Collection Time: 02/18/25  8:22 AM   Result Value Ref Range    Sodium 133 (L) 136 - 145 mmol/L    Potassium 4.0 3.5 - 5.1 mmol/L    Chloride 100 97 - 108 mmol/L    CO2 26 21 - 32 mmol/L    Anion Gap 7 2 - 12 mmol/L    Glucose 104 (H) 65 - 100 mg/dL    BUN 6 6 - 20 MG/DL    Creatinine 0.53 (L) 0.70 - 1.30 MG/DL    BUN/Creatinine Ratio 11 (L) 12 - 20      Est, Glom Filt Rate >90 >60 ml/min/1.73m2    Calcium 9.5 8.5 - 10.1 MG/DL    Total Bilirubin 0.7 0.2 - 1.0 MG/DL    ALT 17 12 - 78 U/L    AST 19 15 - 37 U/L    Alk Phosphatase 91 45 - 117 U/L    Total Protein 7.7 6.4 - 8.2 g/dL    Albumin 3.2 (L) 3.5 - 5.0 g/dL    Globulin 4.5 (H) 2.0 - 4.0 g/dL    Albumin/Globulin Ratio 0.7 (L) 1.1 - 2.2     Protime-INR    Collection Time: 02/18/25  8:22 AM   Result Value Ref Range    INR 1.0 0.9 - 1.1      Protime 10.9 9.2 - 11.2 sec   Extra Tubes Hold    Collection Time: 02/18/25  8:22 AM   Result Value Ref Range    Specimen HOld pst     Comment:        Add-on orders for these samples will be processed based on acceptable specimen integrity and analyte stability, which may vary by analyte.   Troponin    Collection Time: 02/18/25 10:05 AM   Result Value Ref Range    Troponin, High Sensitivity <4 0 - 76 ng/L   Brain Natriuretic Peptide    Collection Time: 02/18/25 10:05 AM   Result Value Ref Range    NT Pro- (H) <125 PG/ML   POC Lactic Acid    Collection Time: 02/18/25 10:39 AM   Result Value Ref Range    POC Lactic Acid 1.31 0.40 - 2.00

## 2025-02-18 NOTE — PROGRESS NOTES
Noted that pt has been dx with a new PE and has been administered Lovenox at 1121.  OT will follow up at a later date to allow Lovenox to take affect.

## 2025-02-18 NOTE — ED NOTES
Messaged pharmacy the following:    \"Pt wife brought pt supply of edaravone and riluzole from home if needed\"

## 2025-02-19 PROCEDURE — 6370000000 HC RX 637 (ALT 250 FOR IP): Performed by: INTERNAL MEDICINE

## 2025-02-19 PROCEDURE — 1100000003 HC PRIVATE W/ TELEMETRY

## 2025-02-19 PROCEDURE — 94640 AIRWAY INHALATION TREATMENT: CPT

## 2025-02-19 PROCEDURE — 6370000000 HC RX 637 (ALT 250 FOR IP): Performed by: HOSPITALIST

## 2025-02-19 PROCEDURE — 97530 THERAPEUTIC ACTIVITIES: CPT

## 2025-02-19 PROCEDURE — 2700000000 HC OXYGEN THERAPY PER DAY

## 2025-02-19 PROCEDURE — 2500000003 HC RX 250 WO HCPCS: Performed by: INTERNAL MEDICINE

## 2025-02-19 PROCEDURE — 6360000002 HC RX W HCPCS: Performed by: PHYSICIAN ASSISTANT

## 2025-02-19 PROCEDURE — 99222 1ST HOSP IP/OBS MODERATE 55: CPT | Performed by: STUDENT IN AN ORGANIZED HEALTH CARE EDUCATION/TRAINING PROGRAM

## 2025-02-19 PROCEDURE — 6360000002 HC RX W HCPCS: Performed by: INTERNAL MEDICINE

## 2025-02-19 PROCEDURE — 2580000003 HC RX 258: Performed by: INTERNAL MEDICINE

## 2025-02-19 PROCEDURE — 97165 OT EVAL LOW COMPLEX 30 MIN: CPT | Performed by: OCCUPATIONAL THERAPIST

## 2025-02-19 PROCEDURE — 6370000000 HC RX 637 (ALT 250 FOR IP): Performed by: STUDENT IN AN ORGANIZED HEALTH CARE EDUCATION/TRAINING PROGRAM

## 2025-02-19 PROCEDURE — 2500000003 HC RX 250 WO HCPCS: Performed by: HOSPITALIST

## 2025-02-19 PROCEDURE — 97112 NEUROMUSCULAR REEDUCATION: CPT | Performed by: OCCUPATIONAL THERAPIST

## 2025-02-19 PROCEDURE — 97162 PT EVAL MOD COMPLEX 30 MIN: CPT

## 2025-02-19 RX ORDER — MORPHINE SULFATE 2 MG/ML
2 INJECTION, SOLUTION INTRAMUSCULAR; INTRAVENOUS EVERY 4 HOURS PRN
Status: DISCONTINUED | OUTPATIENT
Start: 2025-02-19 | End: 2025-02-22 | Stop reason: HOSPADM

## 2025-02-19 RX ORDER — MORPHINE SULFATE 2 MG/ML
1 INJECTION, SOLUTION INTRAMUSCULAR; INTRAVENOUS EVERY 4 HOURS PRN
Status: DISCONTINUED | OUTPATIENT
Start: 2025-02-19 | End: 2025-02-22 | Stop reason: HOSPADM

## 2025-02-19 RX ADMIN — MORPHINE SULFATE 4 MG: 4 INJECTION, SOLUTION INTRAMUSCULAR; INTRAVENOUS at 10:12

## 2025-02-19 RX ADMIN — RILUZOLE 50 MG: 50 TABLET, FILM COATED ORAL at 09:46

## 2025-02-19 RX ADMIN — ISOSORBIDE DINITRATE 10 MG: 10 TABLET ORAL at 13:14

## 2025-02-19 RX ADMIN — HYDROXYZINE HYDROCHLORIDE 10 MG: 10 TABLET ORAL at 15:30

## 2025-02-19 RX ADMIN — MORPHINE SULFATE 2 MG: 2 INJECTION, SOLUTION INTRAMUSCULAR; INTRAVENOUS at 23:51

## 2025-02-19 RX ADMIN — GUAIFENESIN 400 MG: 200 SOLUTION ORAL at 09:44

## 2025-02-19 RX ADMIN — WATER 1000 MG: 1 INJECTION INTRAMUSCULAR; INTRAVENOUS; SUBCUTANEOUS at 09:45

## 2025-02-19 RX ADMIN — ATORVASTATIN CALCIUM 40 MG: 40 TABLET, FILM COATED ORAL at 09:44

## 2025-02-19 RX ADMIN — RILUZOLE 50 MG: 50 TABLET, FILM COATED ORAL at 20:56

## 2025-02-19 RX ADMIN — ENOXAPARIN SODIUM 70 MG: 100 INJECTION SUBCUTANEOUS at 20:55

## 2025-02-19 RX ADMIN — ALBUTEROL SULFATE 2.5 MG: 2.5 SOLUTION RESPIRATORY (INHALATION) at 20:22

## 2025-02-19 RX ADMIN — ALBUTEROL SULFATE 2.5 MG: 2.5 SOLUTION RESPIRATORY (INHALATION) at 09:04

## 2025-02-19 RX ADMIN — BACLOFEN 10 MG: 10 TABLET ORAL at 21:00

## 2025-02-19 RX ADMIN — ATENOLOL 50 MG: 50 TABLET ORAL at 09:44

## 2025-02-19 RX ADMIN — ENOXAPARIN SODIUM 70 MG: 100 INJECTION SUBCUTANEOUS at 09:45

## 2025-02-19 RX ADMIN — AZITHROMYCIN MONOHYDRATE 500 MG: 500 INJECTION, POWDER, LYOPHILIZED, FOR SOLUTION INTRAVENOUS at 09:59

## 2025-02-19 RX ADMIN — Medication 3 MG: at 20:54

## 2025-02-19 RX ADMIN — ACETAMINOPHEN 650 MG: 325 TABLET ORAL at 09:44

## 2025-02-19 RX ADMIN — ACETAMINOPHEN 650 MG: 325 TABLET ORAL at 20:54

## 2025-02-19 RX ADMIN — ACETAMINOPHEN 650 MG: 325 TABLET ORAL at 15:30

## 2025-02-19 RX ADMIN — HYDROXYZINE HYDROCHLORIDE 10 MG: 10 TABLET ORAL at 06:56

## 2025-02-19 RX ADMIN — ISOSORBIDE DINITRATE 10 MG: 10 TABLET ORAL at 20:54

## 2025-02-19 RX ADMIN — SODIUM CHLORIDE, PRESERVATIVE FREE 10 ML: 5 INJECTION INTRAVENOUS at 20:55

## 2025-02-19 RX ADMIN — SODIUM CHLORIDE, PRESERVATIVE FREE 10 ML: 5 INJECTION INTRAVENOUS at 09:45

## 2025-02-19 RX ADMIN — MORPHINE SULFATE 4 MG: 4 INJECTION, SOLUTION INTRAMUSCULAR; INTRAVENOUS at 03:46

## 2025-02-19 RX ADMIN — GUAIFENESIN 400 MG: 200 SOLUTION ORAL at 20:53

## 2025-02-19 RX ADMIN — HYDROXYZINE HYDROCHLORIDE 10 MG: 10 TABLET ORAL at 20:54

## 2025-02-19 RX ADMIN — AMLODIPINE BESYLATE 5 MG: 5 TABLET ORAL at 09:44

## 2025-02-19 RX ADMIN — ISOSORBIDE DINITRATE 10 MG: 10 TABLET ORAL at 09:55

## 2025-02-19 ASSESSMENT — PAIN DESCRIPTION - LOCATION
LOCATION: BACK

## 2025-02-19 ASSESSMENT — PAIN SCALES - GENERAL
PAINLEVEL_OUTOF10: 8
PAINLEVEL_OUTOF10: 3
PAINLEVEL_OUTOF10: 5
PAINLEVEL_OUTOF10: 3
PAINLEVEL_OUTOF10: 5
PAINLEVEL_OUTOF10: 8
PAINLEVEL_OUTOF10: 8

## 2025-02-19 ASSESSMENT — PAIN DESCRIPTION - DESCRIPTORS
DESCRIPTORS: ACHING
DESCRIPTORS: SPASM
DESCRIPTORS: CRAMPING;SPASM

## 2025-02-19 ASSESSMENT — PAIN DESCRIPTION - ORIENTATION
ORIENTATION: LOWER

## 2025-02-19 NOTE — CARE COORDINATION
Care Management Initial Assessment       RUR: 17% moderate risk   Readmission? Yes - 1/22-1/27/25 at [facility] for Influenza  1st IM letter given? 2/18/25  1st  letter given: No      Initial note: Chart review completed prior to assessment. CM completed assessment with pt and pt's spouse at bedside. CM introduced self, role of CM, verified demographics to ensure accuracy, and discussed transition of care planning. Pt resides with spouse in 1 level home with level entry. Spouse is pt's full time caregiver, assisting with ADLs/IADLs in home. Pt uses electric wheelchair for transportation and requires support with transfers. Pt's spouse will transport home at time of d/c. Pt is open with home health services through [facility] and would like to resume these at time of d/c (SN/OT/PT/SLP).    Care management will continue to be available to assist as transition of care planning needs arise. Full readmission assessment below:       02/19/25 1000   Service Assessment   Patient Orientation Alert and Oriented   Cognition Alert   History Provided By Patient;Spouse   Primary Caregiver Spouse   Accompanied By/Relationship Spouse Lindaashtyn Alston   Support Systems Spouse/Significant Other;Family Members   Patient's Healthcare Decision Maker is: Legal Next of Kin  (Spouse is legal NOK)   PCP Verified by CM Yes  (Dr. Jesse Morales)   Last Visit to PCP Within last 3 months   Prior Functional Level Assistance with the following:;Bathing;Dressing;Toileting;Feeding;Cooking;Housework;Shopping;Mobility   Current Functional Level Assistance with the following:;Bathing;Dressing;Toileting;Feeding;Cooking;Housework;Shopping;Mobility   Can patient return to prior living arrangement Yes   Ability to make needs known: Fair   Family able to assist with home care needs: Yes   Would you like for me to discuss the discharge plan with any other family members/significant others, and if so, who? Yes  (Daughter, Linda Alston [phone])

## 2025-02-19 NOTE — PROGRESS NOTES
Pulmonary Progress Note    Patient: Donell Alston                     YOB: 1955        Date- 2/19/2025                           Admit Date: 2/18/2025       CC: Follow up for respiratory failure and pulmonary embolism          IMPRESSION & PLAN:     Acute on chronic respiratory failure  Right-sided pulmonary emboli  Right lower lobe infiltrate-infectious/infarct  Pleuritic chest pain  Small right pleural effusion  Recent influenza A  ALS-has home ventilator  Chronic dysphagia- s/p PEG tube placement,  p.o. puréed and thin liquids okay as per last SLP eval  Chronic tobacco smoking-currently smokes  intermittently     Management plan :     Pulmonary emboli without hemodynamic disturbances or right heart strain.  First episode, provoked from poor mobility secondary to ALS and recent hospital admission.  May benefit from indefinite anticoagulation given persistent immobility with ALS.  On O2 2 LPM, wean gradually to keep sats above 92%  Markedly elevated CRP although procalcitonin normal, continue current antibiotics and follow-up on the pneumonia workup.  pulmonary toilet and bronchodilator nebulizations  Restrictive defect with decreased supine FVC on prior PFT.  He is agreeable to use his home NIV tonight.  Reviewed the effects of ALS on lungs and encouraged consistent use of home ventilator at night after discharge.  Strongly recommended smoking cessation.    Oncology notes reviewed.  Plan discussed with wife at bedside.  We will follow along.       Subjective:    Interval History:    Pleuritic pain, getting morphine    Remains on 2 L.  Afebrile.    Patient apparently refused hospital NIV last night wanting to get his home machine.    Procalcitonin normal, CRP 21.  Sputum cultures and urine pneumococcal antigen pending.  On Rocephin and azithromycin    Flu and COVID swabs negative.    Medications:  Current Facility-Administered Medications   Medication Dose Route Frequency Provider Last Rate

## 2025-02-19 NOTE — PROGRESS NOTES
Hospitalist Progress Note    NAME:   Donell Alston   : 1955   MRN: [id number]     Date/Time: 2025 8:56 AM  Patient PCP: Jesse Morales MD    Estimated discharge date:   Barriers: Improvement in dyspnea, weaning of oxygen, echocardiogram, heme-onc and pulmonology clearance      Assessment / Plan:  Acute pulmonary embolism  CT angiogram pulmonary shows segmental and subsegmental pulmonary embolus right upper lobe and right lower lobe. No definite right heart strain. Right lower lobe infiltrate and small pleural effusion.  Monitor patient in telemetry.  Continue with full dose Lovenox.  Doppler lower extremity was done and report is pending.  Troponin was negative.  Will get echocardiogram to assess heart function.  Patient will need to be transition to oral anticoagulation on discharge.  Will consult oncology for further input.  : Doppler lower extremity negative for DVT.  Echocardiogram is pending.  Patient is still on 2 L of oxygen.  He complains of chest and back pain.  Will start on IV morphine as needed with pain scale.  Awaiting for input from heme-onc.     RLL pneumonia  Continue with Rocephin and azithromycin.  Incentive spirometer.  Follow-up with CBC, blood culture, lactic acid.  DuoNeb, albuterol, Mucinex, Tessalon Perles.  Will consult pulmonology for further input.  : Procalcitonin of 0.05.  Waiting for input from pulmonology.     Acute hypoxic respiratory failure  Patient is currently on 2 L of oxygen which is new for the patient.  Secondary to pulmonary embolism and pneumonia.  Will try to wean off oxygen as tolerated.    Generalized weakness  The patient reports the weakness and requesting for a feeding from G-tube.  Will consult nutritionist for the same.     CAD  HTN  HLD  Continue with amlodipine, atenolol, Lipitor, isosorbide dinitrate.     ALS  Continue with riluzole and edaravone.  Patient follows up with [facility] outpatient.        Medical Decision Making:   I

## 2025-02-19 NOTE — PROGRESS NOTES
Physical Therapy    Orders received, chart reviewed and patient evaluated by physical therapy. Pending progression with skilled acute physical therapy, recommend:    Intermittent physical therapy up to 2-3x/week in previous living setting with additional support needed for mobility and ADLs as at [facility]    Recommend with nursing OOB to chair 3x/day with 2 assist and  pivot transfer . Thank you for completing as able in order to maintain patient strength, endurance and independence.     Full evaluation to follow.

## 2025-02-19 NOTE — CARE COORDINATION
1535 - CM requested for therapy line to document qualifications for hospital bed.      Hiwot Jeff, DARRELN, RN    Care Management  [phone]

## 2025-02-19 NOTE — PLAN OF CARE
Problem: Occupational Therapy - Adult  Goal: By Discharge: Performs self-care activities at highest level of function for planned discharge setting.  See evaluation for individualized goals.  Description: FUNCTIONAL STATUS PRIOR TO ADMISSION:  dx of ALS ~1 year ago, getting HHOT/PT and SLP services, followed at [facility], decline in UE function and wife performs ADLS for pt, pt just obtained bilateral hand steven type resting hand splints, was using home ventilator at night only prior to recent flu dx and hasn't recently been using the ventilator, sleeps in a regular bed, wife assists with bed mobility and pt is able to assist with legs off the bed/not onto bed fully, pt performs multiple transfers at day bed->recliner chair->rocking chair->power chair and bedside commode, pts favorite spot to sit is in his rocking chair and pt frequently rocks to retain his strength, chronic LBP and pt takes baclofen,  wife performs stand pivot transfers with gait belt to and from surfaces, pt is able to bear weight on bilateral LE with wife having one hand assist to manage pts pants up and down during toileting on bedside commode, pt needs at least one hand support for seated balance without back support, bathing performed at bed level by wife and pt is dressed in bed, has a light weight electric wheelchair and pt is unable to manage the controls, pts has nutrition through PEG and orally    HOME SUPPORT: Patient lived wife whom is pts caregiver and pt was getting HHOT/PT and SLP services.    Occupational Therapy Goals:  Initiated 2/19/2025  1.  Patient will remain seated edge of bed with max assist for 2 minutes within 7 day(s).  2.  Patient will tolerate out of bed to chair for one hour for increased independence with ADL transfers within 7 day(s).  3.  Patient will perform toilet transfers to bedside commode with Moderate Assist  within 7 day(s).  4.  Patient will bear weight through BLE with one hand moderate assist support to remain  Neurosurgery, and Psychiatry, 66(4), 774-48.  Van MIN Pompa.A, JUDY Rodriguez, & ERICKA Jenkins (2004.) Assessment of post-stroke quality of life in cost-effectiveness studies: The usefulness of the Barthel Index and the EuroQoL-5D. Quality of Life Research, 13, 427-43       Pain Ratin/10     Activity Tolerance:   Fair     After treatment:   Assisted pt back to stretcher in semi fowlers position with all lines and leads as prior to therapist arrival, heals floated and bilateral UE supported with pillows for joint protection    COMMUNICATION/EDUCATION:   The patient's plan of care was discussed with: physical therapist, registered nurse, and patient and his family    Patient Education  Education Given To: Patient;Family  Education Provided: Role of Therapy;Plan of Care;Mobility Training;Fall Prevention Strategies  Education Method: Verbal;Demonstration  Barriers to Learning: None  Education Outcome: Verbalized understanding;Continued education needed    Thank you for this referral.  Verna Hubbard OTR/L  Minutes: 30    Occupational Therapy Evaluation Charge Determination   History Examination Decision-Making   HIGH Complexity : Extensive review of history including physical, cognitive and psychocial history  LOW Complexity: 1-3 Performance deficits relating to physical, cognitive, or psychosocial skills that result in activity limitations and/or participation restrictions LOW Complexity: No comorbidities that affect functional and  no verbal  or physical assist needed to complete eval tasks   Based on the above components, the patient evaluation is determined to be of the following complexity level: Low

## 2025-02-19 NOTE — PROGRESS NOTES
Physical Therapy    Eval order received, chart reviewed. Spoke with SLP who had just attempted to see pt. Pt not arousable. Had been given Ativan earlier as well. Staff also noting pt very lethargic. Will defer at this time and follow.    Virgen Najera PT

## 2025-02-19 NOTE — PROGRESS NOTES
Attempted to schedule PCP hospital follow up. Unable to reach anyone, unable to leave voicemail since the office is closed due to inclement weather. [facility] information on AVS for patient resource.  Pending patient discharge.

## 2025-02-19 NOTE — PROGRESS NOTES
Comprehensive Nutrition Assessment    Type and Reason for Visit:  Initial, Consult, Nutrition support    Nutrition Recommendations/Plan:   Continue diet as tolerated  Initiate TF via PEG: start Jevity 1.5 (standard with fiber) at 15mL/hr, advance 10mL Q 6hr to goal rate 45mL/hr + 50mL flush Q 6hr. Goal rate will provide 1620 kcals (90%), 69 Pro (100%), 233g CHO, 1021mL water.   Will adjust TF per pt's oral intake.      Malnutrition Assessment:  Malnutrition Status:  At risk for malnutrition (02/19/25 1300)    Context:  Acute Illness     Findings of the 6 clinical characteristics of malnutrition:  Energy Intake:  50% or less of estimated energy requirements for 5 or more days  Weight Loss:  No weight loss     Body Fat Loss:  Unable to assess     Muscle Mass Loss:  Unable to assess    Fluid Accumulation:  No fluid accumulation     Strength:  Not Performed    Nutrition Assessment:     Chart reviewed remotely d/t inclement weather. Pt admitted with acute PE, pneumonia, acute hypoxic respiratory failure, generalized weakness, CAD, HTN, HLD, ALS. No answer to pt's room phone, but I was able to catch his wife on her cell phone. His PEG was placed in December proactively before his respiratory status was too compromised for placement down the road. They have only used it for medications so far. She reports it has been about 2 weeks since he was eating well 2/2 being sick and they'd like to get him started on TF now until his PO intake improves. Will initiate continuous TF to ultimately meet 90% of kcals, 100% of protein. He will continue to have an oral diet in addition to TF which will be adjusted as his PO intake improves. Weight hx stable per EMR.     No PO intake yet documented.     Wt Readings from Last 5 Encounters:   02/18/25 69 kg (152 lb 1.9 oz)   01/22/25 70 kg (154 lb 5.2 oz)   03/21/24 68.9 kg (152 lb)   02/01/24 68 kg (150 lb)   12/26/23 68 kg (150 lb)   ]    Nutrition Related Findings:    Labs: no BMP

## 2025-02-19 NOTE — PROGRESS NOTES
02/18/25 1956   NIV Type   NIV Started/Stopped (S)  Off  (Patient refused hoispital supplied NIV at this time. Pt stated that he hardly uses his home machine. Pt stated that he will have his home machine brought in the morning to him and will use it tomorrow night.)   Assessment   Pulse 80   Respirations 20   SpO2 96 %   Breath Sounds   Respiratory Pattern Regular   Right Upper Lobe Coarse crackles;Scattered wheezes   Right Middle Lobe Coarse crackles;Scattered wheezes   Right Lower Lobe Coarse crackles;Scattered wheezes;Diminished   Left Upper Lobe Coarse crackles;Scattered wheezes   Left Lower Lobe Coarse crackles;Scattered wheezes;Diminished   Settings/Measurements   O2 Flow Rate (L/min) 2 L/min   Oxygen Therapy/Pulse Ox   O2 Therapy Oxygen   O2 Device Nasal cannula   Pulse Oximeter Device Mode Continuous

## 2025-02-19 NOTE — PLAN OF CARE
Problem: Physical Therapy - Adult  Goal: By Discharge: Performs mobility at highest level of function for planned discharge setting.  See evaluation for individualized goals.  Description: FUNCTIONAL STATUS PRIOR TO ADMISSION: Pt lives with his wife and she is his caregiver. Pt has ALS and is now at w/c level. He has very limited movement in BUE and is able to support some weight on his legs and take small turning steps while wife does partial lift and turn assist. Wife dresses and bathes pt in the bed. Wife assists pt throughout the day to transfer to and from his w/c, BSC, recliner and a favorite rocker chair - pt states he likes to rock and the motion feels good to him. The wife does the w/c mobility for him although it is an electrical chair. Note, the pt does have a home ventilator that he uses intermittently at night. He was receiving PT/OT/SLP HH through [facility] prior to adm.    HOME SUPPORT PRIOR TO ADMISSION: The patient lived with wife.    Physical Therapy Goals  Initiated 2/19/2025  1.  Patient will move from supine to sit and sit to supine, scoot up and down, and roll side to side in bed with modified independence within 7 day(s).    2.  Patient will perform sit to stand with moderate assistance within 7 day(s). (Partial stand for transfer)  3.  Patient will transfer from bed to chair and chair to bed with moderate/maximal assistance using the least restrictive device within 7 day(s).      Outcome: Not Progressing   PHYSICAL THERAPY EVALUATION    Patient: Donell Alston (69 y.o. male)  Date: 2/19/2025  Primary Diagnosis: Shortness of breath [R06.02]  Pulmonary embolism and infarction (HCC) [I26.99]  Multiple pulmonary emboli (HCC) [I26.99]  Community acquired pneumonia of right lower lobe of lung [J18.9]       Precautions: Restrictions/Precautions: Fall Risk, Bed Alarm                      ASSESSMENT :   DEFICITS/IMPAIRMENTS:   The patient is limited by decreased functional mobility, strength, activity  tolerance, coordination, balance adm with hx of ALS with moderate to severe limitation in function with weakness and now new PE and PNA. Pt currently needing max A to and from sitting. He needs constant support to sit at edge of bed. Wife present and very supportive and appropriate. She was able to demonstrate how she assists him to scoot up toward HOB when sitting at EOB with pt taking some weight on legs and her assisting at trunk. Pt fatigues quickly, he is on 2L currently and sats remained in 90s. Pt returned to bed with call bell in reach. BUE supported on pillows, heels floated. Transport arriving to take pt to floor.     Based on the impairments listed above pt would benefit from HHPT resuming at d/c.    Patient will benefit from skilled intervention to address the above impairments.    Functional Outcome Measure:  The patient scored 10/24 on the [facility] outcome measure which is indicative of need for continued therapy at d/c.           PLAN :  Recommendations and Planned Interventions:   bed mobility training, transfer training, gait training, therapeutic exercises, patient and family training/education, and therapeutic activities    Frequency/Duration: Patient will be followed by physical therapy to address goals, PT Plan of Care: 3 times/week to address goals.    Recommendations for staff mobility and toileting assistance:  Wife has been transferring pt with her one assist pivot transfer. Pt did not tolerate on eval       Recommendation for discharge: (in order for the patient to meet his/her long term goals):   Intermittent physical therapy up to 2-3x/week in previous living setting with additional support needed for mobility    Other factors to consider for discharge: high risk for falls, not safe to be alone, and concern for safely navigating or managing the home environment    IF patient discharges home will need the following DME: hospital bed  Donell Alston was evaluated today and a DME order was

## 2025-02-19 NOTE — CONSULTS
[facility] [place] [facility]  [address] [facility] [address] [address]  W: [phone] F: [phone]  Hematology/ Oncology Consult Note      Reason for consult:     Donell Alston is a 69 y.o. male who we have been asked to see by Dr. Haywood for acute PE.    Subjective:     Donell Alston patient is a 69-year-old male admitted to [facility] after CT imaging findings consistent with acute pulmonary embolism and right lower lobe pneumonia.  Patient has a past medical history of ALS, is debilitated and immobile with a PEG tube.  Patient also has a past medical history of arthritis, CAD and hypertension.    Patient seen at the bedside in the ED with his wife.  Discussed worsening dysphagia and immobility.  Wife reports patient is able with her assistance to get up to the recliner but is otherwise immobile.  Discussed findings of pulmonary embolism and need for anticoagulation.  Patient remains high risk for further VTE given immobility due to ALS.  Patient shared he is having issues with worsening dysphagia.  Discussed will verify with pharmacy that patient could take DOAC crushed via PEG tube.  Patient is wife also requesting follow-up to be made virtually as it is difficult to get him to appointments.    Discussed the risks vs benefits of initiation of anticoagulation. In general, the risk of life-threatening and fatal bleeding is lower with direct oral anticoagulants  than with vitamin K antagonists, including warfarin. Factors that increase the risk for bleeding include(but not limited to) advanced age, history of prior bleeding (such as GI, post surgical or ICH), kidney and renal disease, diabetes, cancer, drug abuse, endocarditis, frequent falls and concomitant use of antiplatelet medications.         Review of Systems:  Pertinent items are noted in the History of Present Illness.       Past Medical History:   Diagnosis Date

## 2025-02-19 NOTE — PROGRESS NOTES
End of Shift Note    Bedside shift change report given to ALEXEI Ruiz (oncoming nurse) by Trish Thompson RN (offgoing nurse).  Report included the following information SBAR, Intake/Output, MAR, Recent Results, and Cardiac Rhythm Sinus Rhythm    Shift worked:  7a-7p     Shift summary and any significant changes:    Dietician consult. Initiated tube feedings.     Concerns for physician to address:  N/a     Zone phone for oncoming shift:   ?       Activity:  Level of Assistance: Maximum assist, patient does 25-49%  Number times ambulated in hallways past shift: 0  Number of times OOB to chair past shift: 0    Cardiac:   Cardiac Monitoring: Yes      Cardiac Rhythm: Sinus rhythm    Access:  Current line(s): PIV     Genitourinary:   Urinary Status: Voiding    Respiratory:   O2 Device: Nasal cannula  Chronic home O2 use?: NO  Incentive spirometer at bedside: YES    GI:     Current diet:  ADULT DIET; Regular; Low Fat/Low Chol/High Fiber/2 gm Na  ADULT TUBE FEEDING; PEG; Standard with Fiber; Continuous; 15; Yes; 10; Q 6 hours; 45; 50; Q 6 hours  DIET ONE TIME MESSAGE;  Passing flatus: YES    Pain Management:   Patient states pain is manageable on current regimen: YES    Skin:  Larry Scale Score: 14  Interventions: Wound Offloading (Prevention Methods): Bed, pressure reduction mattress, Pillows, Repositioning, Turning, Elevate heels    Patient Safety:  Fall Risk: Nursing Judgement-Fall Risk High(Add Comments): No  Fall Risk Interventions  Nursing Judgement-Fall Risk High(Add Comments): No  Toilet Every 2 Hours-In Advance of Need: Yes  Hourly Visual Checks: Awake, In bed, Quiet  Fall Visual Posted: Socks  Room Door Open: Yes  Alarm On: Bed  Patient Moved Closer to Nursing Station: No    Active Consults:   IP CONSULT TO HOSPITALIST  IP CONSULT TO PULMONOLOGY  IP CONSULT TO ONCOLOGY  IP CONSULT TO DIETITIAN    Length of Stay:  Expected LOS: 2  Actual LOS: 1    Trish Thompson RN

## 2025-02-20 ENCOUNTER — APPOINTMENT (OUTPATIENT)
Facility: HOSPITAL | Age: 70
DRG: 175 | End: 2025-02-20
Attending: INTERNAL MEDICINE
Payer: MEDICARE

## 2025-02-20 LAB
ANION GAP SERPL CALC-SCNC: 8 MMOL/L (ref 2–12)
BUN SERPL-MCNC: 6 MG/DL (ref 6–20)
BUN/CREAT SERPL: 13 (ref 12–20)
CALCIUM SERPL-MCNC: 9.3 MG/DL (ref 8.5–10.1)
CHLORIDE SERPL-SCNC: 101 MMOL/L (ref 97–108)
CO2 SERPL-SCNC: 26 MMOL/L (ref 21–32)
CREAT SERPL-MCNC: 0.48 MG/DL (ref 0.7–1.3)
ECHO BSA: 1.85 M2
ECHO LA DIAMETER INDEX: 2.2 CM/M2
ECHO LA DIAMETER: 4.1 CM
ECHO LV EDV A4C: 43 ML
ECHO LV EDV INDEX A4C: 23 ML/M2
ECHO LV EF PHYSICIAN: 55 %
ECHO LV EJECTION FRACTION A4C: 57 %
ECHO LV ESV A4C: 18 ML
ECHO LV ESV INDEX A4C: 10 ML/M2
ECHO LV FRACTIONAL SHORTENING: 28 % (ref 28–44)
ECHO LV INTERNAL DIMENSION DIASTOLE INDEX: 2.15 CM/M2
ECHO LV INTERNAL DIMENSION DIASTOLIC: 4 CM (ref 4.2–5.9)
ECHO LV INTERNAL DIMENSION SYSTOLIC INDEX: 1.56 CM/M2
ECHO LV INTERNAL DIMENSION SYSTOLIC: 2.9 CM
ECHO LV IVSD: 1.1 CM (ref 0.6–1)
ECHO LV MASS 2D: 136.2 G (ref 88–224)
ECHO LV MASS INDEX 2D: 73.2 G/M2 (ref 49–115)
ECHO LV POSTERIOR WALL DIASTOLIC: 1 CM (ref 0.6–1)
ECHO LV RELATIVE WALL THICKNESS RATIO: 0.5
ECHO LVOT AREA: 3.1 CM2
ECHO LVOT DIAM: 2 CM
ECHO RV INTERNAL DIMENSION: 3.2 CM
ERYTHROCYTE [DISTWIDTH] IN BLOOD BY AUTOMATED COUNT: 11.8 % (ref 11.5–14.5)
GLUCOSE SERPL-MCNC: 114 MG/DL (ref 65–100)
HCT VFR BLD AUTO: 34.9 % (ref 36.6–50.3)
HGB BLD-MCNC: 11.5 G/DL (ref 12.1–17)
LACTATE SERPL-SCNC: 1 MMOL/L (ref 0.4–2)
MAGNESIUM SERPL-MCNC: 1.8 MG/DL (ref 1.6–2.4)
MCH RBC QN AUTO: 31.4 PG (ref 26–34)
MCHC RBC AUTO-ENTMCNC: 33 G/DL (ref 30–36.5)
MCV RBC AUTO: 95.4 FL (ref 80–99)
NRBC # BLD: 0 K/UL (ref 0–0.01)
NRBC BLD-RTO: 0 PER 100 WBC
PHOSPHATE SERPL-MCNC: 3.5 MG/DL (ref 2.6–4.7)
PLATELET # BLD AUTO: 318 K/UL (ref 150–400)
PMV BLD AUTO: 8.7 FL (ref 8.9–12.9)
POTASSIUM SERPL-SCNC: 3.6 MMOL/L (ref 3.5–5.1)
RBC # BLD AUTO: 3.66 M/UL (ref 4.1–5.7)
SODIUM SERPL-SCNC: 135 MMOL/L (ref 136–145)
WBC # BLD AUTO: 8.4 K/UL (ref 4.1–11.1)

## 2025-02-20 PROCEDURE — 36415 COLL VENOUS BLD VENIPUNCTURE: CPT

## 2025-02-20 PROCEDURE — 80048 BASIC METABOLIC PNL TOTAL CA: CPT

## 2025-02-20 PROCEDURE — 84100 ASSAY OF PHOSPHORUS: CPT

## 2025-02-20 PROCEDURE — 1100000003 HC PRIVATE W/ TELEMETRY

## 2025-02-20 PROCEDURE — 6360000002 HC RX W HCPCS: Performed by: INTERNAL MEDICINE

## 2025-02-20 PROCEDURE — 6370000000 HC RX 637 (ALT 250 FOR IP): Performed by: HOSPITALIST

## 2025-02-20 PROCEDURE — 6370000000 HC RX 637 (ALT 250 FOR IP): Performed by: INTERNAL MEDICINE

## 2025-02-20 PROCEDURE — 85027 COMPLETE CBC AUTOMATED: CPT

## 2025-02-20 PROCEDURE — 2500000003 HC RX 250 WO HCPCS: Performed by: INTERNAL MEDICINE

## 2025-02-20 PROCEDURE — 93321 DOPPLER ECHO F-UP/LMTD STD: CPT

## 2025-02-20 PROCEDURE — 6370000000 HC RX 637 (ALT 250 FOR IP): Performed by: STUDENT IN AN ORGANIZED HEALTH CARE EDUCATION/TRAINING PROGRAM

## 2025-02-20 PROCEDURE — 83735 ASSAY OF MAGNESIUM: CPT

## 2025-02-20 PROCEDURE — 2580000003 HC RX 258: Performed by: INTERNAL MEDICINE

## 2025-02-20 PROCEDURE — 6360000002 HC RX W HCPCS: Performed by: PHYSICIAN ASSISTANT

## 2025-02-20 PROCEDURE — 2500000003 HC RX 250 WO HCPCS: Performed by: HOSPITALIST

## 2025-02-20 PROCEDURE — 94640 AIRWAY INHALATION TREATMENT: CPT

## 2025-02-20 PROCEDURE — 83605 ASSAY OF LACTIC ACID: CPT

## 2025-02-20 RX ORDER — AZITHROMYCIN 250 MG/1
500 TABLET, FILM COATED ORAL DAILY
Status: DISCONTINUED | OUTPATIENT
Start: 2025-02-21 | End: 2025-02-22 | Stop reason: HOSPADM

## 2025-02-20 RX ORDER — AZITHROMYCIN 250 MG/1
500 TABLET, FILM COATED ORAL DAILY
Status: DISCONTINUED | OUTPATIENT
Start: 2025-02-21 | End: 2025-02-20

## 2025-02-20 RX ADMIN — APIXABAN 10 MG: 5 TABLET, FILM COATED ORAL at 12:25

## 2025-02-20 RX ADMIN — MORPHINE SULFATE 2 MG: 2 INJECTION, SOLUTION INTRAMUSCULAR; INTRAVENOUS at 05:32

## 2025-02-20 RX ADMIN — ACETAMINOPHEN 650 MG: 325 TABLET ORAL at 09:36

## 2025-02-20 RX ADMIN — BACLOFEN 10 MG: 10 TABLET ORAL at 20:44

## 2025-02-20 RX ADMIN — RILUZOLE 50 MG: 50 TABLET, FILM COATED ORAL at 09:25

## 2025-02-20 RX ADMIN — ENOXAPARIN SODIUM 70 MG: 100 INJECTION SUBCUTANEOUS at 09:25

## 2025-02-20 RX ADMIN — AZITHROMYCIN MONOHYDRATE 500 MG: 500 INJECTION, POWDER, LYOPHILIZED, FOR SOLUTION INTRAVENOUS at 10:51

## 2025-02-20 RX ADMIN — MORPHINE SULFATE 2 MG: 2 INJECTION, SOLUTION INTRAMUSCULAR; INTRAVENOUS at 13:22

## 2025-02-20 RX ADMIN — ATENOLOL 50 MG: 50 TABLET ORAL at 09:24

## 2025-02-20 RX ADMIN — MORPHINE SULFATE 2 MG: 2 INJECTION, SOLUTION INTRAMUSCULAR; INTRAVENOUS at 20:45

## 2025-02-20 RX ADMIN — WATER 1000 MG: 1 INJECTION INTRAMUSCULAR; INTRAVENOUS; SUBCUTANEOUS at 12:21

## 2025-02-20 RX ADMIN — GUAIFENESIN 400 MG: 200 SOLUTION ORAL at 09:24

## 2025-02-20 RX ADMIN — ALBUTEROL SULFATE 2.5 MG: 2.5 SOLUTION RESPIRATORY (INHALATION) at 20:51

## 2025-02-20 RX ADMIN — AMLODIPINE BESYLATE 5 MG: 5 TABLET ORAL at 09:24

## 2025-02-20 RX ADMIN — SODIUM CHLORIDE, PRESERVATIVE FREE 10 ML: 5 INJECTION INTRAVENOUS at 09:25

## 2025-02-20 RX ADMIN — ATORVASTATIN CALCIUM 40 MG: 40 TABLET, FILM COATED ORAL at 09:24

## 2025-02-20 RX ADMIN — ISOSORBIDE DINITRATE 10 MG: 10 TABLET ORAL at 09:24

## 2025-02-20 RX ADMIN — SODIUM CHLORIDE, PRESERVATIVE FREE 10 ML: 5 INJECTION INTRAVENOUS at 20:46

## 2025-02-20 RX ADMIN — RILUZOLE 50 MG: 50 TABLET, FILM COATED ORAL at 20:45

## 2025-02-20 RX ADMIN — HYDROXYZINE HYDROCHLORIDE 10 MG: 10 TABLET ORAL at 09:35

## 2025-02-20 RX ADMIN — ISOSORBIDE DINITRATE 10 MG: 10 TABLET ORAL at 18:05

## 2025-02-20 RX ADMIN — Medication 3 MG: at 20:45

## 2025-02-20 RX ADMIN — HYDROXYZINE HYDROCHLORIDE 10 MG: 10 TABLET ORAL at 20:44

## 2025-02-20 RX ADMIN — ISOSORBIDE DINITRATE 10 MG: 10 TABLET ORAL at 13:22

## 2025-02-20 RX ADMIN — BACLOFEN 10 MG: 10 TABLET ORAL at 09:35

## 2025-02-20 RX ADMIN — GUAIFENESIN 400 MG: 200 SOLUTION ORAL at 20:44

## 2025-02-20 RX ADMIN — APIXABAN 10 MG: 5 TABLET, FILM COATED ORAL at 20:44

## 2025-02-20 RX ADMIN — ALBUTEROL SULFATE 2.5 MG: 2.5 SOLUTION RESPIRATORY (INHALATION) at 09:24

## 2025-02-20 ASSESSMENT — PAIN SCALES - GENERAL
PAINLEVEL_OUTOF10: 0
PAINLEVEL_OUTOF10: 3
PAINLEVEL_OUTOF10: 7
PAINLEVEL_OUTOF10: 0
PAINLEVEL_OUTOF10: 7
PAINLEVEL_OUTOF10: 0
PAINLEVEL_OUTOF10: 4
PAINLEVEL_OUTOF10: 4
PAINLEVEL_OUTOF10: 0
PAINLEVEL_OUTOF10: 0
PAINLEVEL_OUTOF10: 7

## 2025-02-20 ASSESSMENT — PAIN DESCRIPTION - LOCATION
LOCATION: BACK
LOCATION: BACK

## 2025-02-20 ASSESSMENT — PAIN DESCRIPTION - DESCRIPTORS
DESCRIPTORS: ACHING
DESCRIPTORS: ACHING

## 2025-02-20 ASSESSMENT — PAIN DESCRIPTION - ORIENTATION
ORIENTATION: LOWER
ORIENTATION: LOWER

## 2025-02-20 NOTE — PROGRESS NOTES
End of Shift Report     Shift: 1900- 0700 am  Mental Status/Behavior: A&Ox4.Calm & cooperative  Activity: Independent  Pain: c/o Lower back pain. PRN tylenol baclofen & IV morphine give. See MAR  Significant Events:Currently on TF jevity @45 ml /hr (goal rate) with 50 ml flush q 6. NSR on tele;Afebrile , on 2L NC Kept bed on the lowest position, bed alarm on and call bell within reach. Wife at bedside. Uses a urinal. Repositioning done q2.

## 2025-02-20 NOTE — PLAN OF CARE
Problem: Respiratory - Adult  Goal: Achieves optimal ventilation and oxygenation  2/20/2025 1012 by Malorie Quinn RN  Outcome: Progressing  2/20/2025 0926 by Hiwot Smiley, RT  Outcome: Progressing  2/20/2025 0248 by Joseph Segura RN  Outcome: Progressing  2/20/2025 0006 by Tasneem Curtis RCP  Outcome: Progressing     Problem: Safety - Adult  Goal: Free from fall injury  2/20/2025 1012 by Malroie Quinn RN  Outcome: Progressing  2/20/2025 0248 by Joseph Segura RN  Outcome: Progressing     Problem: Discharge Planning  Goal: Discharge to home or other facility with appropriate resources  2/20/2025 1012 by Malorie Quinn RN  Outcome: Progressing  2/20/2025 0248 by Joseph Segura RN  Outcome: Progressing     Problem: Nutrition Deficit:  Goal: Optimize nutritional status  2/20/2025 1012 by Malorie Quinn RN  Outcome: Progressing  2/20/2025 0248 by Joseph Segura RN  Outcome: Progressing     Problem: Skin/Tissue Integrity  Goal: Skin integrity remains intact  Description: 1.  Monitor for areas of redness and/or skin breakdown  2.  Assess vascular access sites hourly  3.  Every 4-6 hours minimum:  Change oxygen saturation probe site  4.  Every 4-6 hours:  If on nasal continuous positive airway pressure, respiratory therapy assess nares and determine need for appliance change or resting period  2/20/2025 1012 by Malorie Quinn RN  Outcome: Progressing  2/20/2025 0248 by Joseph Segura RN  Outcome: Progressing     Problem: Pain  Goal: Verbalizes/displays adequate comfort level or baseline comfort level  Outcome: Progressing

## 2025-02-20 NOTE — PLAN OF CARE
Problem: Physical Therapy - Adult  Goal: By Discharge: Performs mobility at highest level of function for planned discharge setting.  See evaluation for individualized goals.  Description: FUNCTIONAL STATUS PRIOR TO ADMISSION: Pt lives with his wife and she is his caregiver. Pt has ALS and is now at w/c level. He has very limited movement in BUE and is able to support some weight on his legs and take small turning steps while wife does partial lift and turn assist. Wife dresses and bathes pt in the bed. Wife assists pt throughout the day to transfer to and from his w/c, BSC, recliner and a favorite rocker chair - pt states he likes to rock and the motion feels good to him. The wife does the w/c mobility for him although it is an electrical chair    HOME SUPPORT PRIOR TO ADMISSION: The patient lived with wife.    Physical Therapy Goals  Initiated 2/19/2025  1.  Patient will move from supine to sit and sit to supine, scoot up and down, and roll side to side in bed with modified independence within 7 day(s).    2.  Patient will perform sit to stand with moderate assistance within 7 day(s). (Partial stand for transfer)  3.  Patient will transfer from bed to chair and chair to bed with moderate/maximal assistance using the least restrictive device within 7 day(s).      2/19/2025 1317 by Yasmine Najera, PT  Outcome: Not Progressing     Problem: Occupational Therapy - Adult  Goal: By Discharge: Performs self-care activities at highest level of function for planned discharge setting.  See evaluation for individualized goals.  Description: FUNCTIONAL STATUS PRIOR TO ADMISSION:  dx of ALS ~1 year ago, getting HHOT/PT and SLP services, [facility], decline in UE function and wife performs ADLS for pt, pt just obtained bilateral hand steven type resting hand splints, was using home ventilator at night only prior to recent flu dx and hasn't recently been using the ventilator, sleeps in a regular bed, wife assists with  level of function for planned discharge setting.  See evaluation for individualized goals.  Description: FUNCTIONAL STATUS PRIOR TO ADMISSION:  dx of ALS ~1 year ago, getting HHOT/PT and SLP services, followed at [facility], decline in UE function and wife performs ADLS for pt, pt just obtained bilateral hand steven type resting hand splints, was using home ventilator at night only prior to recent flu dx and hasn't recently been using the ventilator, sleeps in a regular bed, wife assists with bed mobility and pt is able to assist with legs off the bed/not onto bed fully, pt performs multiple transfers at day bed->recliner chair->rocking chair->power chair and bedside commode, pts favorite spot to sit is in his rocking chair and pt frequently rocks to retain his strength, chronic LBP and pt takes baclofen,  wife performs stand pivot transfers with gait belt to and from surfaces, pt is able to bear weight on bilateral LE with wife having one hand assist to manage pts pants up and down during toileting on bedside commode, pt needs at least one hand support for seated balance without back support, bathing performed at bed level by wife and pt is dressed in bed, has a light weight electric wheelchair and pt is unable to manage the controls, pts has nutrition through PEG and orally    HOME SUPPORT: Patient lived wife whom is pts caregiver and pt was getting HHOT/PT and SLP services.    Occupational Therapy Goals:  Initiated 2/19/2025  1.  Patient will remain seated edge of bed with max assist for 2 minutes within 7 day(s).  2.  Patient will tolerate out of bed to chair for one hour for increased independence with ADL transfers within 7 day(s).  3.  Patient will perform toilet transfers to bedside commode with Moderate Assist  within 7 day(s).  4.  Patient will bear weight through BLE with one hand moderate assist support to remain standing by caregiver during total assist toileting within 7 day(s).      2/19/2025 1332 by Stevie

## 2025-02-20 NOTE — PROGRESS NOTES
Pulmonary Progress Note    Patient: Donell Alston                     YOB: 1955        Date- 2/20/2025                           Admit Date: 2/18/2025       CC: Follow up for respiratory failure and pulmonary embolism          IMPRESSION & PLAN:     Acute on chronic respiratory failure  Right-sided pulmonary emboli  Right lower lobe infiltrate-infectious/infarct  Pleuritic chest pain  Small right pleural effusion  Recent influenza A  ALS-has home ventilator  Chronic dysphagia- s/p PEG tube placement,  p.o. puréed and thin liquids okay as per last SLP eval  Chronic tobacco smoking-currently smokes  intermittently     Management plan :     Pulmonary emboli without hemodynamic disturbances or right heart strain.  First episode, provoked from poor mobility secondary to ALS and recent hospital admission.  May benefit from indefinite anticoagulation given persistent immobility with ALS.  Minimal hemoptysis today with what looks like old blood in the setting of pulm embolism.  There is some drop in H&H, no tachycardia or hypotensive episodes, monitor closely and obtain chest x-ray.  Sputum cultures if he coughs up again.  On O2 2 LPM, wean gradually to keep sats above 92%  Markedly elevated CRP although procalcitonin normal, urine pneumococcal antigen pending.day 3 of Rocephin and azithromycin, switch azithromycin to PEG  pulmonary toilet and bronchodilator nebulizations  Case management to help with oxygen adapter his home machine needs.  Recommended consistent long-term use of home ventilator at night.   smoking cessation.    Case discussed with primary team.  Plan reviewed with patient and his wife     We will follow along.    Total time of encounter > 50 minutes       Subjective:    Interval History:    Coughed up small amount of dark red sputum this morning.    Pleuritic pain controlled with morphine.    Remains on 2 L.  Afebrile.    home NIV machine could not be used last night, apparently

## 2025-02-20 NOTE — PROGRESS NOTES
Occupational Therapy    Spoke with patient and nurse who indicate patient scheduled for ECHO today and wish to delay therapy until after ECHO. Will defer for now and follow up as able.    Second attempt patient in echo.      Kiersten Fu, OTR/L

## 2025-02-20 NOTE — PLAN OF CARE
Problem: Respiratory - Adult  Goal: Achieves optimal ventilation and oxygenation  Outcome: Progressing     Problem: Physical Therapy - Adult  Goal: By Discharge: Performs mobility at highest level of function for planned discharge setting.  See evaluation for individualized goals.  Description: FUNCTIONAL STATUS PRIOR TO ADMISSION: Pt lives with his wife and she is his caregiver. Pt has ALS and is now at w/c level. He has very limited movement in BUE and is able to support some weight on his legs and take small turning steps while wife does partial lift and turn assist. Wife dresses and bathes pt in the bed. Wife assists pt throughout the day to transfer to and from his w/c, BSC, recliner and a favorite rocker chair - pt states he likes to rock and the motion feels good to him. The wife does the w/c mobility for him although it is an electrical chair    HOME SUPPORT PRIOR TO ADMISSION: The patient lived with wife.    Physical Therapy Goals  Initiated 2/19/2025  1.  Patient will move from supine to sit and sit to supine, scoot up and down, and roll side to side in bed with modified independence within 7 day(s).    2.  Patient will perform sit to stand with moderate assistance within 7 day(s). (Partial stand for transfer)  3.  Patient will transfer from bed to chair and chair to bed with moderate/maximal assistance using the least restrictive device within 7 day(s).      2/19/2025 1317 by Yasmine Najera, PT  Outcome: Not Progressing     Problem: Occupational Therapy - Adult  Goal: By Discharge: Performs self-care activities at highest level of function for planned discharge setting.  See evaluation for individualized goals.  Description: FUNCTIONAL STATUS PRIOR TO ADMISSION:  dx of ALS ~1 year ago, getting HHOT/PT and SLP services, followed at [facility], decline in UE function and wife performs ADLS for pt, pt just obtained bilateral hand steven type resting hand splints, was using home ventilator at night only prior

## 2025-02-20 NOTE — CARE COORDINATION
1046 to 1054 -  left VM for RD to determine if patient's tube feeds are changing.  Unable to reach patient via room phone/cell phone.  CM contacted wife who is agreeable to [facility] with [facility] and hospital bed.  [facility] orders sent to [facility] and hospital bed orders sent to [facility] via [facility].    1202 -  consult received, \"If the patient goes home tomorrow with home oxygen, he may need a special adapter to connect it to his home respirator.  Wife was showing me the machine.\"  CM discussed with CM ; message sent to attending to have pulmonology address this.    1232 - [facility] said that the hospital bed order is being processed and they will call to discuss copays and arrange delivery.      1302 - Attending said they believe the DME company can supply this and they have asked the wife to call the company.    1517 to 1523 - [facility] said that patient's previous episode ended on 2/14/2025 and they are not accepting his insurance.  CM contacted spouse who was at bedside with patient.  She said she received a notice that there would be a change with the patient's insurance around 2/14/2025 but that HH was looking into fighting that.  Patient/wife agreeable to CM looking into other HH options.  Referrals sent to [facility].      FELIPE Lawson, RN    Care Management  [phone]

## 2025-02-20 NOTE — PROGRESS NOTES
Hospitalist Progress Note    NAME:   Donell Alston   : 1955   MRN: [id number]     Date/Time: 2025 11:46 AM  Patient PCP: Jesse Morales MD    Estimated discharge date:   Barriers: May need home oxygen      Assessment / Plan:    Acute pulmonary embolism  Hemoptysis  CT angiogram pulmonary shows segmental and subsegmental pulmonary embolus right upper lobe and right lower lobe. No definite right heart strain. Right lower lobe infiltrate and small pleural effusion.  Bilateral lower extremity Doppler: No DVT  Troponin was negative.  Echocardiogram pending but with no definite heart strain and negative troponin, not necessary prior to discharge  Transition Lovenox to Eliquis this afternoon  Hematology input appreciated  Monitor blood and sputum.  Looks like old blood     RLL pneumonia  Rapid influenza negative  COVID negative  Blood cultures no growth  Procalcitonin 0.05  Continue with Rocephin and azithromycin.  Incentive spirometer.  Robitussin via PEG    Acute hypoxic respiratory failure  May need home oxygen.  Looking at his home respirator, may need a special adapter to hook up oxygen.  Will inform case management    Generalized weakness  The patient reports weakness and requesting for feeding from G-tube.  Will consult nutritionist for the same.     CAD  HTN  HLD  Continue with amlodipine, atenolol, Lipitor, isosorbide dinitrate.     ALS  Continue with riluzole and edaravone.  Patient follows up with [facility] outpatient.        Medical Decision Making:   I personally reviewed labs:  procalcitonin, labs pending from today  I personally reviewed imaging: Doppler lower extremity  I personally reviewed EKG:   Toxic drug monitoring: H&H while patient is on Lovenox  Discussed case with: Patient, RN, wife at the bedside        Code Status: Full code  DVT Prophylaxis: Lovenox  GI Prophylaxis:  Baseline: Lives with his wife at home, has chronic dysphagia and slow to voice, as per the wife, patient  Radial dial pulse 2+. Capillary refill normal  Neurologic:      No facial asymmetry. No aphasia or slurred speech. Symmetrical strength, Sensation grossly intact. AAOx4.    Reviewed most current lab test results and cultures  YES  Reviewed most current radiology test results   YES  Review and summation of old records today    NO  Reviewed patient's current orders and MAR    YES  PMH/SH reviewed - no change compared to H&P    Procedures: see electronic medical records for all procedures/Xrays and details which were not copied into this note but were reviewed prior to creation of Plan.      LABS:  I reviewed today's most current labs and imaging studies.  Pertinent labs include:  Recent Labs     02/18/25  0822 02/20/25  0443   WBC 9.2 8.4   HGB 12.6 11.5*   HCT 36.9 34.9*    318     Recent Labs     02/18/25  0822 02/20/25  0443   * 135*   K 4.0 3.6    101   CO2 26 26   GLUCOSE 104* 114*   BUN 6 6   CREATININE 0.53* 0.48*   CALCIUM 9.5 9.3   MG  --  1.8   PHOS  --  3.5   BILITOT 0.7  --    AST 19  --    ALT 17  --    INR 1.0  --        Signed: Jose Alvarado MD

## 2025-02-21 ENCOUNTER — APPOINTMENT (OUTPATIENT)
Facility: HOSPITAL | Age: 70
DRG: 175 | End: 2025-02-21
Payer: MEDICARE

## 2025-02-21 LAB
ERYTHROCYTE [DISTWIDTH] IN BLOOD BY AUTOMATED COUNT: 11.9 % (ref 11.5–14.5)
HCT VFR BLD AUTO: 35.5 % (ref 36.6–50.3)
HGB BLD-MCNC: 11.7 G/DL (ref 12.1–17)
MCH RBC QN AUTO: 31.4 PG (ref 26–34)
MCHC RBC AUTO-ENTMCNC: 33 G/DL (ref 30–36.5)
MCV RBC AUTO: 95.2 FL (ref 80–99)
NRBC # BLD: 0 K/UL (ref 0–0.01)
NRBC BLD-RTO: 0 PER 100 WBC
PLATELET # BLD AUTO: 368 K/UL (ref 150–400)
PMV BLD AUTO: 8.6 FL (ref 8.9–12.9)
RBC # BLD AUTO: 3.73 M/UL (ref 4.1–5.7)
WBC # BLD AUTO: 7.1 K/UL (ref 4.1–11.1)

## 2025-02-21 PROCEDURE — 6370000000 HC RX 637 (ALT 250 FOR IP): Performed by: INTERNAL MEDICINE

## 2025-02-21 PROCEDURE — 6360000002 HC RX W HCPCS: Performed by: INTERNAL MEDICINE

## 2025-02-21 PROCEDURE — 94640 AIRWAY INHALATION TREATMENT: CPT

## 2025-02-21 PROCEDURE — 97530 THERAPEUTIC ACTIVITIES: CPT

## 2025-02-21 PROCEDURE — 36415 COLL VENOUS BLD VENIPUNCTURE: CPT

## 2025-02-21 PROCEDURE — 85027 COMPLETE CBC AUTOMATED: CPT

## 2025-02-21 PROCEDURE — 6370000000 HC RX 637 (ALT 250 FOR IP): Performed by: HOSPITALIST

## 2025-02-21 PROCEDURE — 1100000003 HC PRIVATE W/ TELEMETRY

## 2025-02-21 PROCEDURE — 2500000003 HC RX 250 WO HCPCS: Performed by: INTERNAL MEDICINE

## 2025-02-21 PROCEDURE — 2500000003 HC RX 250 WO HCPCS: Performed by: HOSPITALIST

## 2025-02-21 PROCEDURE — 71045 X-RAY EXAM CHEST 1 VIEW: CPT

## 2025-02-21 PROCEDURE — 6370000000 HC RX 637 (ALT 250 FOR IP): Performed by: STUDENT IN AN ORGANIZED HEALTH CARE EDUCATION/TRAINING PROGRAM

## 2025-02-21 PROCEDURE — 87070 CULTURE OTHR SPECIMN AEROBIC: CPT

## 2025-02-21 PROCEDURE — 2700000000 HC OXYGEN THERAPY PER DAY

## 2025-02-21 PROCEDURE — 87205 SMEAR GRAM STAIN: CPT

## 2025-02-21 RX ORDER — ASPIRIN 81 MG/1
81 TABLET ORAL DAILY
Status: SHIPPED | COMMUNITY
Start: 2025-02-21

## 2025-02-21 RX ADMIN — ISOSORBIDE DINITRATE 10 MG: 10 TABLET ORAL at 12:14

## 2025-02-21 RX ADMIN — AMLODIPINE BESYLATE 5 MG: 5 TABLET ORAL at 08:01

## 2025-02-21 RX ADMIN — ALBUTEROL SULFATE 2.5 MG: 2.5 SOLUTION RESPIRATORY (INHALATION) at 19:55

## 2025-02-21 RX ADMIN — APIXABAN 10 MG: 5 TABLET, FILM COATED ORAL at 20:11

## 2025-02-21 RX ADMIN — APIXABAN 10 MG: 5 TABLET, FILM COATED ORAL at 08:01

## 2025-02-21 RX ADMIN — GUAIFENESIN 400 MG: 200 SOLUTION ORAL at 20:10

## 2025-02-21 RX ADMIN — ISOSORBIDE DINITRATE 10 MG: 10 TABLET ORAL at 16:59

## 2025-02-21 RX ADMIN — HYDROXYZINE HYDROCHLORIDE 10 MG: 10 TABLET ORAL at 12:14

## 2025-02-21 RX ADMIN — SODIUM CHLORIDE, PRESERVATIVE FREE 10 ML: 5 INJECTION INTRAVENOUS at 20:11

## 2025-02-21 RX ADMIN — SODIUM CHLORIDE, PRESERVATIVE FREE 10 ML: 5 INJECTION INTRAVENOUS at 08:02

## 2025-02-21 RX ADMIN — RILUZOLE 50 MG: 50 TABLET, FILM COATED ORAL at 20:12

## 2025-02-21 RX ADMIN — ATENOLOL 50 MG: 50 TABLET ORAL at 08:01

## 2025-02-21 RX ADMIN — WATER 1000 MG: 1 INJECTION INTRAMUSCULAR; INTRAVENOUS; SUBCUTANEOUS at 12:12

## 2025-02-21 RX ADMIN — ATORVASTATIN CALCIUM 40 MG: 40 TABLET, FILM COATED ORAL at 08:01

## 2025-02-21 RX ADMIN — ALBUTEROL SULFATE 2.5 MG: 2.5 SOLUTION RESPIRATORY (INHALATION) at 07:03

## 2025-02-21 RX ADMIN — HYDROXYZINE HYDROCHLORIDE 10 MG: 10 TABLET ORAL at 05:18

## 2025-02-21 RX ADMIN — RILUZOLE 50 MG: 50 TABLET, FILM COATED ORAL at 08:04

## 2025-02-21 RX ADMIN — BACLOFEN 10 MG: 10 TABLET ORAL at 20:10

## 2025-02-21 RX ADMIN — Medication 3 MG: at 20:11

## 2025-02-21 RX ADMIN — HYDROXYZINE HYDROCHLORIDE 10 MG: 10 TABLET ORAL at 20:11

## 2025-02-21 RX ADMIN — GUAIFENESIN 400 MG: 200 SOLUTION ORAL at 08:00

## 2025-02-21 RX ADMIN — ISOSORBIDE DINITRATE 10 MG: 10 TABLET ORAL at 08:01

## 2025-02-21 RX ADMIN — AZITHROMYCIN 500 MG: 250 TABLET, FILM COATED ORAL at 08:01

## 2025-02-21 NOTE — CARE COORDINATION
price check until order is sent to the pharmacy.  Wife confirmed that she can see the price on the ifrah once it is processed.  She is aware that she can notify the attending or the patient's cardiologist if they do not feel they can afford the eliquis long term.    1226 - Referral sent to [facility] for new tube feeds.    1433 to 1442 - Message received from [facility]:      Thank you who will be the following MD? The order also says \"standard with fiber\" needs to have formula name, dose, frequency/rate, etc. and needs to include \"or equivalent formula\" please. Also the latest clinical and RD note.\"      CM attempted to contact Sakina with [facility]; unable to reach anyone.  CM contacted RD team who will have RD update note to say \"or equivalent formula.\"      CM sent the following message to [facility]:     Patient's PCP would likely need to follow: Jesse Morales - can you all contact him or would we need to? I have asked the RD to update her note to say \"or equivalent formula\" - could you accept it written in a note? The RD note was already uploaded and lists the formula as Jevity 1.5. The order lists the delivery method as bolus, volume at 270 (mL) and frequency 4 times a day. Please let us know what other information is needed. Thank you!        Several HH agencies have declined.  More referrals sent out.    1449 - RD notified CM that an updated note is in.  Updated note sent to [facility] with a message to review.    1700 - [facility] sent message:    We have pt ready for weekend dc, please reach out to our on-call team at [phone]  to coordinate DC and delivery needs.  Thank you!      SHARRON asked if anything else was needed to accommodate for discharge tomorrow.  HH still pending.     02/21/25 0838   Services At/After Discharge   Transition of Care Consult (CM Consult) Home Health   Services At/After Discharge PT;OT;Nursing services;SLP   Mode of Transport at Discharge Other (see comment)  (Mother)   Confirm Follow Up  Transport Family   Condition of Participation: Discharge Planning   The Plan for Transition of Care is related to the following treatment goals: Home with HH pending   The Patient and/or Patient Representative was provided with a Choice of Provider? Patient   The Patient and/Or Patient Representative agree with the Discharge Plan? Yes   Freedom of Choice list was provided with basic dialogue that supports the patient's individualized plan of care/goals, treatment preferences, and shares the quality data associated with the providers?  Yes       FELIPE Lawson, RN    Care Management  [phone]

## 2025-02-21 NOTE — PROGRESS NOTES
Pulmonary Progress Note    Patient: Donell Alston                     YOB: 1955        Date- 2/21/2025                           Admit Date: 2/18/2025       CC: Follow up for respiratory failure and pulmonary embolism          IMPRESSION & PLAN:     Acute on chronic respiratory failure  Right-sided pulmonary emboli  Right lower lobe infiltrate-infectious/infarct  Pleuritic chest pain  Small right pleural effusion  Recent influenza A  ALS-has home ventilator  Chronic dysphagia- s/p PEG tube placement,  p.o. puréed and thin liquids okay as per last SLP eval  Chronic tobacco smoking-currently smokes  intermittently     Management plan :     Pulmonary emboli without hemodynamic disturbances or right heart strain.  First episode, provoked from poor mobility secondary to ALS and recent hospital admission.  May benefit from indefinite anticoagulation given persistent immobility with ALS.  Minimal hemoptysis in setting of pulmonary embolism and anticoagulation, subsided  Right lower lobe infiltrate more prominent on reviewing chest x-ray today, clinically improving. Sputum culture and urine pneumococcal antigen pending  Markedly elevated CRP although procalcitonin normal, at risk for infection due to ALS.  day 4/5 of Rocephin and azithromycin  pulmonary toilet.  Switch albuterol nebs to as needed at discharge.  Hypoxia improved, off supplemental oxygen now.  Nocturnal NIV with his home machine.  Recommend consistent long-term use of home ventilator at night.  Recommend absolute smoking cessation.    Plan reviewed with patient and on phone with his wife.  Will offer follow-up with my partners at [facility].    Total time of encounter > 50 minutes       Subjective:    Interval History:    Coughed up  minimal brownish sputum today.    Came off O2.    Used his home NIV last night.    Sputum cultures and urine pneumococcal antigen pending.  On Rocephin and azithromycin    Flu and COVID swabs

## 2025-02-21 NOTE — PROGRESS NOTES
Nutrition Note    Chart reviewed and case discussed during IDR. Pt has been tolerating TF well at continuous goal rate. He had his first meal PO this morning as he cannot eat without being in chair position. Will transition to bolus TF regimen for home. Pt to continue oral diet as tolerated and adjust amount of TF at home per PO intake. Discussed with RN, pt and family, all agreeable.     Hold continuous TF ~2hrs prior to first bolus. Then given Jevity 1.5 @ 270mL bolus 4 times daily + 30mL water flush before and after. Provides 1620 kcals (90%), 69 Pro (100%), 233g CHO, 1061mL water.     If tolerated, this will be pt's TF order for discharge. RN may use pump for boluses for convenience while inpatient. Plan for gravity feeds at home.     Home infusion company may use alternate/equivalent formula per supply availability as needed.     Electronically signed by Gerda Win RD on 2/21/25 at 11:33 AM EST    Contact: [phone]

## 2025-02-21 NOTE — DISCHARGE INSTRUCTIONS
HOSPITALIST DISCHARGE INSTRUCTIONS    NAME: Donell Alston   :  1955   MRN:  [id number]       ADMIT DATE: 2025   DISCHARGE DATE:  25        It is important that you take the medication exactly as they are prescribed.   Keep your medication in the bottles provided by the pharmacist and keep a list of the medication names, dosages, and times to be taken in your wallet.   Do not take other medications without consulting your doctor.       What to do at Home    Recommended diet:  regular diet    Recommended activity: activity as tolerated      If you have questions regarding the hospital related prescriptions or hospital related issues please call [facility] office at . You can always direct your questions to your primary care doctor if you are unable to reach your hospital physician; your PCP works as an extension of your hospital doctor just like your hospital doctor is an extension of your PCP for your time at the hospital ([facility])    If you experience any of the following symptoms then please call your primary care physician or return to the emergency room if you cannot get hold of your doctor:    Fever, chills, nausea, vomiting, or persistent diarrhea  Worsening weakness or new problems with your speech or balance  Dark stools or visible blood in your stools  New Leg swelling or shortness of breath as these could be signs of a clot    Additional Instructions:      Bring these papers with you to your follow up appointments. The papers will help your doctors be sure to continue the care plan from the hospital.

## 2025-02-21 NOTE — PROGRESS NOTES
Hospitalist Progress Note    NAME:   Donell Alston   : 1955   MRN: [id number]     Date/Time: 2025 10:21 AM  Patient PCP: Jesse Morales MD    Estimated discharge date:   Barriers:  finishing abx Sat.  Off oxygen.  Daughter available to help Saturday.        Assessment / Plan:    Acute pulmonary embolism  Hemoptysis  CT angiogram pulmonary shows segmental and subsegmental pulmonary embolus right upper lobe and right lower lobe. No definite right heart strain. Right lower lobe infiltrate and small pleural effusion.  Bilateral lower extremity Doppler: No DVT  Troponin was negative.  Echocardiogram pending but with no definite heart strain and negative troponin, not necessary prior to discharge  Transition Lovenox to Eliquis   Hematology input appreciated  Monitor blood and sputum.  Coughing up less bleed today     RLL pneumonia  Rapid influenza negative  COVID negative  Procalcitonin 0.05  Completing empiric Rocephin /5 and azithromycin 3/3   Incentive spirometer.  Robitussin via PEG    Acute hypoxic respiratory failure  Weaned off oxygen this AM. RA 94%.      Generalized weakness  The patient reports weakness and requesting for feeding from G-tube while here  PT OT eval.  Rec HH PT at discharge  Spouse reports that her daughter is coming to stay with them and help but won't get here until Saturday.     CAD  HTN  HLD  Continue with amlodipine, atenolol, Lipitor, isosorbide dinitrate.     ALS  Continue with riluzole and edaravone.  Patient follows up with [facility] outpatient.        Medical Decision Making:   I personally reviewed labs:  procalcitonin, labs pending from today  I personally reviewed imaging: Doppler lower extremity  I personally reviewed EKG:   Toxic drug monitoring: H&H while patient is on Lovenox  Discussed case with: Patient, RN, wife at the bedside        Code Status: Full code  DVT Prophylaxis: Lovenox  GI Prophylaxis:  Baseline: Lives with his wife at home, has chronic           Regular  rhythm,  No  Murmur.   No edema  Abdomen:        Soft, NT. ND  BS+, G-tube in place  Extremities:     No cyanosis.  No clubbing,                            Skin turgor normal, Radial dial pulse 2+. Capillary refill normal  Neurologic:      No facial asymmetry. No aphasia or slurred speech. Symmetrical strength, Sensation grossly intact. AAOx4.    Reviewed most current lab test results and cultures  YES  Reviewed most current radiology test results   YES  Review and summation of old records today    NO  Reviewed patient's current orders and MAR    YES  PMH/SH reviewed - no change compared to H&P    Procedures: see electronic medical records for all procedures/Xrays and details which were not copied into this note but were reviewed prior to creation of Plan.      LABS:  I reviewed today's most current labs and imaging studies.  Pertinent labs include:  Recent Labs     02/20/25  0443 02/21/25  0445   WBC 8.4 7.1   HGB 11.5* 11.7*   HCT 34.9* 35.5*    368     Recent Labs     02/20/25  0443   *   K 3.6      CO2 26   GLUCOSE 114*   BUN 6   CREATININE 0.48*   CALCIUM 9.3   MG 1.8   PHOS 3.5       Signed: Jose Alvarado MD

## 2025-02-21 NOTE — PLAN OF CARE
Problem: Occupational Therapy - Adult  Goal: By Discharge: Performs self-care activities at highest level of function for planned discharge setting.  See evaluation for individualized goals.  Description: FUNCTIONAL STATUS PRIOR TO ADMISSION:  dx of ALS ~1 year ago, getting HHOT/PT and SLP services, followed at [facility], decline in UE function and wife performs ADLS for pt, pt just obtained bilateral hand steven type resting hand splints, was using home ventilator at night only prior to recent flu dx and hasn't recently been using the ventilator, sleeps in a regular bed, wife assists with bed mobility and pt is able to assist with legs off the bed/not onto bed fully, pt performs multiple transfers at day bed->recliner chair->rocking chair->power chair and bedside commode, pts favorite spot to sit is in his rocking chair and pt frequently rocks to retain his strength, chronic LBP and pt takes baclofen,  wife performs stand pivot transfers with gait belt to and from surfaces, pt is able to bear weight on bilateral LE with wife having one hand assist to manage pts pants up and down during toileting on bedside commode, pt needs at least one hand support for seated balance without back support, bathing performed at bed level by wife and pt is dressed in bed, has a light weight electric wheelchair and pt is unable to manage the controls, pts has nutrition through PEG and orally    HOME SUPPORT: Patient lived wife whom is pts caregiver and pt was getting HHOT/PT and SLP services.    Occupational Therapy Goals:  Initiated 2/19/2025  1.  Patient will remain seated edge of bed with max assist for 2 minutes within 7 day(s).  2.  Patient will tolerate out of bed to chair for one hour for increased independence with ADL transfers within 7 day(s).  3.  Patient will perform toilet transfers to bedside commode with Moderate Assist  within 7 day(s).  4.  Patient will bear weight through BLE with one hand moderate assist support to remain  Cognitive Status: WFL    Functional Mobility and Transfers for ADLs:     Issued spouse gait belt and educated on how to safely don either above or below PEG site. She first demonstrates on therapist then with her spouse. Educated on technique for holding belt for counterbalance when assisting patient with toileting needs. Patient able to maintain stand ~2 minutes but requiring too much assist for spouse to be able to release one hand to simulate hygiene       Balance:  Seated in recliner patient able to assist with leaning trunk forwards off of backrest and maintains forward position with minimal assistance.       Pain Rating:  No c/o pain    Activity Tolerance:   Fair , requires frequent rest breaks, and observed shortness of breath on exertion  Please refer to the flowsheet for vital signs taken during this treatment.    After treatment:   Patient left in no apparent distress sitting up in chair, Call bell within reach, Caregiver / family present, and Updated patient's board on functional status and mobility recommendations    COMMUNICATION/EDUCATION:   The patient's plan of care was discussed with:     Thank you for this referral.  Kiersten Fu OT  Minutes: 15

## 2025-02-21 NOTE — PLAN OF CARE
Problem: Respiratory - Adult  Goal: Achieves optimal ventilation and oxygenation  2/21/2025 0009 by Joseph Segura RN  Outcome: Progressing  2/20/2025 1012 by Malorie Quinn RN  Outcome: Progressing     Problem: Safety - Adult  Goal: Free from fall injury  2/21/2025 0009 by Joseph Segura RN  Outcome: Progressing  2/20/2025 1012 by Malorie Quinn RN  Outcome: Progressing     Problem: Discharge Planning  Goal: Discharge to home or other facility with appropriate resources  2/21/2025 0009 by Joseph Segura RN  Outcome: Progressing  2/20/2025 1012 by Malorie Quinn RN  Outcome: Progressing     Problem: Nutrition Deficit:  Goal: Optimize nutritional status  2/21/2025 0009 by Joseph Segura RN  Outcome: Progressing  2/20/2025 1012 by Malorie Quinn RN  Outcome: Progressing     Problem: Skin/Tissue Integrity  Goal: Skin integrity remains intact  Description: 1.  Monitor for areas of redness and/or skin breakdown  2.  Assess vascular access sites hourly  3.  Every 4-6 hours minimum:  Change oxygen saturation probe site  4.  Every 4-6 hours:  If on nasal continuous positive airway pressure, respiratory therapy assess nares and determine need for appliance change or resting period  2/21/2025 0009 by Joseph Segura RN  Outcome: Progressing  2/20/2025 1012 by Malorie Quinn RN  Outcome: Progressing     Problem: Pain  Goal: Verbalizes/displays adequate comfort level or baseline comfort level  2/21/2025 0009 by Joseph Segura RN  Outcome: Progressing  2/20/2025 1012 by Malorie Quinn RN  Outcome: Progressing

## 2025-02-21 NOTE — PLAN OF CARE
Problem: Physical Therapy - Adult  Goal: By Discharge: Performs mobility at highest level of function for planned discharge setting.  See evaluation for individualized goals.  Description: FUNCTIONAL STATUS PRIOR TO ADMISSION: Pt lives with his wife and she is his caregiver. Pt has ALS and is now at w/c level. He has very limited movement in BUE and is able to support some weight on his legs and take small turning steps while wife does partial lift and turn assist. Wife dresses and bathes pt in the bed. Wife assists pt throughout the day to transfer to and from his w/c, BSC, recliner and a favorite rocker chair - pt states he likes to rock and the motion feels good to him. The wife does the w/c mobility for him although it is an electrical chair    HOME SUPPORT PRIOR TO ADMISSION: The patient lived with wife.    Physical Therapy Goals  Initiated 2/19/2025  1.  Patient will move from supine to sit and sit to supine, scoot up and down, and roll side to side in bed with modified independence within 7 day(s).    2.  Patient will perform sit to stand with moderate assistance within 7 day(s). (Partial stand for transfer)  3.  Patient will transfer from bed to chair and chair to bed with moderate/maximal assistance using the least restrictive device within 7 day(s).      Outcome: Progressing   PHYSICAL THERAPY TREATMENT    Patient: Donell Alston (69 y.o. male)  Date: 2/21/2025  Diagnosis: Shortness of breath [R06.02]  Pulmonary embolism and infarction (HCC) [I26.99]  Multiple pulmonary emboli (HCC) [I26.99]  Community acquired pneumonia of right lower lobe of lung [J18.9] Pulmonary embolism and infarction (HCC)      Precautions: Fall Risk, Bed Alarm                      ASSESSMENT:  Patient continues to benefit from skilled PT services and is progressing towards goals. Pt received in chair with wife present. Focus of session on pt/ family education for use of gait belt to increase safety with transfers/ standing. Pt's  wife demo ability to place belt and assist pt in standing from chair height; required max A. Pt's wife endorses ability to assist pt in home environment at current LOF; has support from daughter as well.     Pt remains appropriate for d/c home with 24/7 assist and follow up PT.          PLAN:  Patient continues to benefit from skilled intervention to address the above impairments.  Continue treatment per established plan of care.        Recommendation for discharge: (in order for the patient to meet his/her long term goals):   Intermittent physical therapy up to 2-3x/week in previous living setting with additional support needed for safety with mobility (assist in place at baseline)    Other factors to consider for discharge: high risk for falls, not safe to be alone, and concern for safely navigating or managing the home environment    IF patient discharges home will need the following DME: hospital bed on order       SUBJECTIVE:   Patient stated, \"I'm a little hot with all of these pillows and stuff.\"    OBJECTIVE DATA SUMMARY:   Critical Behavior:  Orientation  Overall Orientation Status: Within Functional Limits  Orientation Level: Oriented X4  Cognition  Overall Cognitive Status: WFL    Functional Mobility Training:  Bed Mobility:  Bed Mobility Training  Bed Mobility Training: No  Transfers:  Transfer Training  Transfer Training: Yes  Interventions: Verbal cues;Safety awareness training  Sit to Stand: Substantial/Maximal assistance (from chair height, use of gait belt)  Stand to Sit: Partial/Moderate assistance  Balance:  Balance  Sitting: Impaired  Sitting - Static: Poor (constant support)  Sitting - Dynamic: Poor (constant support)  Standing: Impaired  Standing - Static: Constant support;Poor;Fair  Standing - Dynamic: Not tested       Pain Rating:  No c/o pain    Activity Tolerance:   Fair     After treatment:   Patient left in no apparent distress sitting up in chair, Call bell within reach, and Caregiver /

## 2025-02-22 VITALS
BODY MASS INDEX: 22.48 KG/M2 | DIASTOLIC BLOOD PRESSURE: 95 MMHG | TEMPERATURE: 97.5 F | RESPIRATION RATE: 16 BRPM | HEART RATE: 85 BPM | OXYGEN SATURATION: 94 % | HEIGHT: 70 IN | WEIGHT: 157 LBS | SYSTOLIC BLOOD PRESSURE: 129 MMHG

## 2025-02-22 LAB
EKG ATRIAL RATE: 66 BPM
EKG DIAGNOSIS: NORMAL
EKG P AXIS: 64 DEGREES
EKG P-R INTERVAL: 174 MS
EKG Q-T INTERVAL: 454 MS
EKG QRS DURATION: 86 MS
EKG QTC CALCULATION (BAZETT): 475 MS
EKG R AXIS: 56 DEGREES
EKG T AXIS: 48 DEGREES
EKG VENTRICULAR RATE: 66 BPM
ERYTHROCYTE [DISTWIDTH] IN BLOOD BY AUTOMATED COUNT: 11.9 % (ref 11.5–14.5)
FLUID CULTURE: NORMAL
HCT VFR BLD AUTO: 33 % (ref 36.6–50.3)
HGB BLD-MCNC: 11 G/DL (ref 12.1–17)
Lab: NORMAL
MCH RBC QN AUTO: 31.4 PG (ref 26–34)
MCHC RBC AUTO-ENTMCNC: 33.3 G/DL (ref 30–36.5)
MCV RBC AUTO: 94.3 FL (ref 80–99)
NRBC # BLD: 0 K/UL (ref 0–0.01)
NRBC BLD-RTO: 0 PER 100 WBC
ORGANISM ID: NORMAL
PLATELET # BLD AUTO: 365 K/UL (ref 150–400)
PMV BLD AUTO: 8.9 FL (ref 8.9–12.9)
RBC # BLD AUTO: 3.5 M/UL (ref 4.1–5.7)
S PNEUM AG SPEC QL LA: NEGATIVE
SPECIMEN SOURCE: NORMAL
SPECIMEN: NORMAL
WBC # BLD AUTO: 8.7 K/UL (ref 4.1–11.1)

## 2025-02-22 PROCEDURE — 6360000002 HC RX W HCPCS: Performed by: INTERNAL MEDICINE

## 2025-02-22 PROCEDURE — 36415 COLL VENOUS BLD VENIPUNCTURE: CPT

## 2025-02-22 PROCEDURE — 2500000003 HC RX 250 WO HCPCS: Performed by: INTERNAL MEDICINE

## 2025-02-22 PROCEDURE — 6370000000 HC RX 637 (ALT 250 FOR IP): Performed by: INTERNAL MEDICINE

## 2025-02-22 PROCEDURE — 2500000003 HC RX 250 WO HCPCS: Performed by: HOSPITALIST

## 2025-02-22 PROCEDURE — 85027 COMPLETE CBC AUTOMATED: CPT

## 2025-02-22 PROCEDURE — 6370000000 HC RX 637 (ALT 250 FOR IP): Performed by: HOSPITALIST

## 2025-02-22 PROCEDURE — 94640 AIRWAY INHALATION TREATMENT: CPT

## 2025-02-22 RX ADMIN — AZITHROMYCIN 500 MG: 250 TABLET, FILM COATED ORAL at 09:08

## 2025-02-22 RX ADMIN — RILUZOLE 50 MG: 50 TABLET, FILM COATED ORAL at 09:13

## 2025-02-22 RX ADMIN — GUAIFENESIN 400 MG: 200 SOLUTION ORAL at 09:12

## 2025-02-22 RX ADMIN — ALBUTEROL SULFATE 2.5 MG: 2.5 SOLUTION RESPIRATORY (INHALATION) at 07:11

## 2025-02-22 RX ADMIN — AMLODIPINE BESYLATE 5 MG: 5 TABLET ORAL at 09:09

## 2025-02-22 RX ADMIN — ATENOLOL 50 MG: 50 TABLET ORAL at 09:08

## 2025-02-22 RX ADMIN — APIXABAN 10 MG: 5 TABLET, FILM COATED ORAL at 09:09

## 2025-02-22 RX ADMIN — ATORVASTATIN CALCIUM 40 MG: 40 TABLET, FILM COATED ORAL at 09:09

## 2025-02-22 RX ADMIN — WATER 1000 MG: 1 INJECTION INTRAMUSCULAR; INTRAVENOUS; SUBCUTANEOUS at 10:59

## 2025-02-22 RX ADMIN — ISOSORBIDE DINITRATE 10 MG: 10 TABLET ORAL at 09:08

## 2025-02-22 NOTE — PLAN OF CARE
Problem: Respiratory - Adult  Goal: Achieves optimal ventilation and oxygenation  2/22/2025 0136 by Joseph Segura, RN  Outcome: Progressing  2/22/2025 0119 by Roger East RCP  Outcome: Progressing     Problem: Safety - Adult  Goal: Free from fall injury  Outcome: Progressing     Problem: Discharge Planning  Goal: Discharge to home or other facility with appropriate resources  Outcome: Progressing     Problem: Nutrition Deficit:  Goal: Optimize nutritional status  Outcome: Progressing     Problem: Physical Therapy - Adult  Goal: By Discharge: Performs mobility at highest level of function for planned discharge setting.  See evaluation for individualized goals.  Description: FUNCTIONAL STATUS PRIOR TO ADMISSION: Pt lives with his wife and she is his caregiver. Pt has ALS and is now at w/c level. He has very limited movement in BUE and is able to support some weight on his legs and take small turning steps while wife does partial lift and turn assist. Wife dresses and bathes pt in the bed. Wife assists pt throughout the day to transfer to and from his w/c, BSC, recliner and a favorite rocker chair - pt states he likes to rock and the motion feels good to him. The wife does the w/c mobility for him although it is an electrical chair    HOME SUPPORT PRIOR TO ADMISSION: The patient lived with wife.    Physical Therapy Goals  Initiated 2/19/2025  1.  Patient will move from supine to sit and sit to supine, scoot up and down, and roll side to side in bed with modified independence within 7 day(s).    2.  Patient will perform sit to stand with moderate assistance within 7 day(s). (Partial stand for transfer)  3.  Patient will transfer from bed to chair and chair to bed with moderate/maximal assistance using the least restrictive device within 7 day(s).      2/21/2025 1418 by Yasmine Quinonez, PT  Outcome: Progressing     Problem: Occupational Therapy - Adult  Goal: By Discharge: Performs self-care activities at highest level of

## 2025-02-22 NOTE — DISCHARGE SUMMARY
Discharge Summary    Name: Donell Alston  [id number]  YOB: 1955 (Age: 69 y.o.)   Date of Admission: 2/18/2025  Date of Discharge: 2/22/2025  Attending Physician: Consuelo Ramey MD    Discharge Diagnosis:   Acute pulmonary embolism  Hemoptysis  RLL Pneumonia  Acute hypoxic respiratory failure  Generalized weakness  CAD  Hypertension  Hyperlipidemia  ALS    Consultations:  IP CONSULT TO HOSPITALIST  IP CONSULT TO PULMONOLOGY  IP CONSULT TO ONCOLOGY  IP CONSULT TO DIETITIAN  IP CONSULT TO CASE MANAGEMENT  IP CONSULT TO CASE MANAGEMENT  IP CONSULT TO CASE MANAGEMENT  IP CONSULT TO CASE MANAGEMENT      Brief Admission History/Reason for Admission Per Sherrie Haywood MD:   Donell Alston is a 69 y.o.  male with PMHx significant for ALS known riluzole and edaravone, chronic dysphagia status post PEG tube, CAD, HTN, HLD, arthritis.  He lives with his wife at home and is a full code.  Patient was admitted between 1/22/2025 - 1/27/2025 with acute hypoxic respiratory failure secondary to influenza A.  He generally follows up with [facility] for his ALS.  Patient was on his way to [facility] clinic today when he had cough with hemoptysis.  He was brought to our ED for further evaluation.  Patient reports being hot and cold.  No nausea or vomiting.     In the ED, patient was hypoxic with saturation of 86% on room air.  He was placed on 2 L of oxygen and currently maintaining saturation between 90 to 91%.  Rest of his vital signs were stable.     EKG showed sinus rhythm 66 bpm, QTc of 475, nonspecific ST-T wave changes.  Chest x-ray showed mild right basilar opacity airspace disease.  CT angiogram pulmonary shows segmental and subsegmental pulmonary embolus right upper lobe and right lower lobe. No definite right heart strain. Right lower lobe infiltrate and small pleural effusion.    Brief Hospital Course by Main Problems:   Acute pulmonary embolism  Hemoptysis  CT angiogram pulmonary

## 2025-02-22 NOTE — PROGRESS NOTES
1305. Patient discharge home with Wife. All lines removed, belongings with patient, and CM needs addressed. AVS provided in duplicate and all questions answered prior to discharge.

## 2025-02-22 NOTE — CARE COORDINATION
Chart reviewed. CM having trouble finding pt a HH agency who will take pt insurance. CM contacted [facility] on call pharmacist to make them aware of pt d/c plan. [facility] to deliver tube feeding equipment this afternoon. CM contacted Sakina with [facility] who noted [facility] can assist pt with SN services in addition to providing home tube feeds. CM met with pt and pt spouse to make them aware. Pt spouse noted [facility] contacted pt spouse today to resume PT/OT/SN services; though, CM referral was declined by [facility]. Pt spouse noted [facility] had dropped pt for HH services, but are now agreeing to resume care. CM to send [facility] a message via [employer]. Pt spouse to transport pt home. CM needs complete at this time.     LEÓN Edgar  Care Management  [facility]   [phone]

## 2025-02-23 LAB
BACTERIA SPEC CULT: NORMAL
GRAM STN SPEC: NORMAL
SERVICE CMNT-IMP: NORMAL

## 2025-02-24 ENCOUNTER — FOLLOWUP TELEPHONE ENCOUNTER (OUTPATIENT)
Facility: HOSPITAL | Age: 70
End: 2025-02-24

## 2025-02-24 NOTE — TELEPHONE ENCOUNTER
0831 - Discharge summary uploaded in [employer] for [facility].        Hiwot Jeff, DARRELN, RN  [facility] Care Management

## 2025-02-25 NOTE — PROGRESS NOTES
Physician Progress Note      PATIENT:               NIECY CHAND  CSN #:                  [id number]  :                       1955  ADMIT DATE:       2025 8:23 AM  DISCH DATE:        2025 2:08 PM  RESPONDING  PROVIDER #:        Consuelo Ramey MD          QUERY TEXT:    Pt admitted with an  acute  PE.  Pt noted to have RLL  pneumonia.  If   possible, please document in the progress notes and discharge summary if you   are evaluating and/or treating any of the following:    Note: CAP and HCAP indicate where the pneumonia was acquired, not a specific   type.    The medical record reflects the following:  Risk Factors: ALS  with  chronic  dysphagia  Clinical Indicators: CT  chest-  Right lower lobe infiltrate and small pleural   effusion.  SLP cleared him for pur?ed diet with thin liquids at last admission.  Has   PEG  tube  Treatment:   IV  Rocephin    Thank you  very  much  Options provided:  -- Possible Aspiration pneumonia  -- Pneumonia,   please  document  type  -- Other - I will add my own diagnosis  -- Disagree - Not applicable / Not valid  -- Disagree - Clinically unable to determine / Unknown  -- Refer to Clinical Documentation Reviewer    PROVIDER RESPONSE TEXT:    Pneumonia, please document type    Query created by: Trudy Calle on 2025 1:58 PM      Electronically signed by:  Consuelo Ramey MD 2025 8:05 AM

## 2025-03-04 NOTE — PROGRESS NOTES
Thank you for choosing our Emergency Department for your care.  It is our privilege to care for you in your time of need.  In the next several days, you may receive a survey via email or mailed to your home about your experience with our team.  We would greatly appreciate you taking a few minutes to complete the survey, as we use this information to learn what we have done well and what we could be doing better. Thank you for trusting us with your care!    Below you will find a list of your tests from today's visit.   Labs and Radiology Studies  No results found for this or any previous visit (from the past 12 hour(s)).  XR KNEE LEFT (3 VIEWS)    Result Date: 3/4/2025  EXAM: XR KNEE LEFT (3 VIEWS) ACC#: [id number]  INDICATION: pain  COMPARISON: None. TECHNIQUE: 3 views of the left knee. FINDINGS: No acute fractures, dislocations, or radiopaque foreign bodies.     No acute bony abnormalities. Electronically signed by ROQUE Aguilar    ------------------------------------------------------------------------------------------------------------  The evaluation and treatment you received in the Emergency Department were for an urgent problem. It is important that you follow-up with a doctor, nurse practitioner, or physician assistant to:  (1) confirm your diagnosis,  (2) re-evaluation of changes in your illness and treatment, and (3) for ongoing care. Please take your discharge instructions with you when you go to your follow-up appointment.     If you have any problem arranging a follow-up appointment, contact us!  If your symptoms become worse or you do not improve as expected, please return to us. We are available 24 hours a day.     If a prescription has been provided, please fill it as soon as possible to prevent a delay in treatment. If you have any questions or reservations about taking the medication due to side effects or interactions with other medications, please call your primary care provider or contact us   Attempted to schedule a PCP hospital follow up. Unable to reach the office and unable to leave a voicemail. [facility] placed [facility] information AVS for patient resource. Pending patient discharge. Tara Carrasquillo, Care Management Assistant

## 2025-03-13 ENCOUNTER — TELEMEDICINE (OUTPATIENT)
Age: 70
End: 2025-03-13
Payer: MEDICARE

## 2025-03-13 DIAGNOSIS — I26.99 PULMONARY EMBOLISM AND INFARCTION (HCC): Primary | ICD-10-CM

## 2025-03-13 PROCEDURE — 1123F ACP DISCUSS/DSCN MKR DOCD: CPT | Performed by: STUDENT IN AN ORGANIZED HEALTH CARE EDUCATION/TRAINING PROGRAM

## 2025-03-13 PROCEDURE — 1159F MED LIST DOCD IN RCRD: CPT | Performed by: STUDENT IN AN ORGANIZED HEALTH CARE EDUCATION/TRAINING PROGRAM

## 2025-03-13 PROCEDURE — 99214 OFFICE O/P EST MOD 30 MIN: CPT | Performed by: STUDENT IN AN ORGANIZED HEALTH CARE EDUCATION/TRAINING PROGRAM

## 2025-03-13 NOTE — PROGRESS NOTES
Donell Alston, was evaluated through a synchronous (real-time) audio-video encounter. The patient (or guardian if applicable) is aware that this is a billable service, which includes applicable co-pays. This Virtual Visit was conducted with patient's (and/or legal guardian's) consent. Patient identification was verified, and a caregiver was present when appropriate.   The patient was located at Home: [address] [id number]  Provider was located at Facility (Appt Dept): [address] [place] [address] [address]  [place] [address]  Confirm you are appropriately licensed, registered, or certified to deliver care in the [facility] where the patient is located as indicated above. If you are not or unsure, please re-schedule the visit: Yes, I confirm.     Donell Alston (:  1955) is a New patient, presenting virtually for evaluation of the following:  Chief Complaint   Patient presents with    Follow-up     PE     1. Have you been to the ER, urgent care clinic since your last visit?  Hospitalized since your last visit? Yes    2. Have you seen or consulted any other health care providers outside of the [facility] since your last visit?  Include any pap smears or colon screening. No       Patient-Reported Vitals  No data recorded         --Mu Coyle MA

## 2025-03-13 NOTE — PROGRESS NOTES
[facility] [place] [facility]  [address] [address]  W: [phone] F: [phone]  Hematology/ Oncology Consult Note      Reason for consult:     Donell Alston is a 69 y.o. male who we have been asked to see by Dr. Haywood for acute PE.    Subjective:     Donell Alston patient is a 69-year-old male admitted to [facility] after CT imaging findings consistent with acute pulmonary embolism and right lower lobe pneumonia.  Patient has a past medical history of ALS, is debilitated and immobile with a PEG tube.  Patient also has a past medical history of arthritis, CAD and hypertension.    Patient seen at the bedside in the ED with his wife.  Discussed worsening dysphagia and immobility.  Wife reports patient is able with her assistance to get up to the recliner but is otherwise immobile.  Discussed findings of pulmonary embolism and need for anticoagulation.  Patient remains high risk for further VTE given immobility due to ALS.  Patient shared he is having issues with worsening dysphagia.  Discussed will verify with pharmacy that patient could take DOAC crushed via PEG tube.  Patient is wife also requesting follow-up to be made virtually as it is difficult to get him to appointments.    Discussed the risks vs benefits of initiation of anticoagulation. In general, the risk of life-threatening and fatal bleeding is lower with direct oral anticoagulants  than with vitamin K antagonists, including warfarin. Factors that increase the risk for bleeding include(but not limited to) advanced age, history of prior bleeding (such as GI, post surgical or ICH), kidney and renal disease, diabetes, cancer, drug abuse, endocarditis, frequent falls and concomitant use of antiplatelet medications.       Interval History:     3/13/25  Donell Alston, was evaluated through a synchronous (real-time) audio-video encounter. The patient

## (undated) DEVICE — BIPOLAR IRRIGATOR INTEGRATED TUBING AND BIPOLAR CORD SET, DISPOSABLE

## (undated) DEVICE — DRAPE,ORTHOMAX,EXTREMITY: Brand: MEDLINE

## (undated) DEVICE — SUTURE VCRL SZ 0 L36IN ABSRB UD L36MM CT-1 1/2 CIR J946H

## (undated) DEVICE — MASTISOL ADHESIVE LIQ 2/3ML

## (undated) DEVICE — 4-PORT MANIFOLD: Brand: NEPTUNE 2

## (undated) DEVICE — GLOVE SURG SZ 8 L12IN FNGR THK79MIL GRN LTX FREE

## (undated) DEVICE — PADDING CAST W6INXL4YD NONSTERILE COT RAYON MICROPLEATED

## (undated) DEVICE — GLOVE SURG SZ 75 L12IN FNGR THK94MIL STD WHT LTX FREE

## (undated) DEVICE — BONE WAX WHITE: Brand: BONE WAX WHITE

## (undated) DEVICE — DRILL BIT 7080510 11 MM DRILL BIT S

## (undated) DEVICE — HALTER TRACTION HD W/ TRI COTTON LINING FOAM LTX

## (undated) DEVICE — ZIMMER® STERILE DISPOSABLE TOURNIQUET CUFF WITH PROTECTIVE SLEEVE AND PLC, DUAL PORT, SINGLE BLADDER, 34 IN. (86 CM)

## (undated) DEVICE — TAPE,CLOTH/SILK,CURAD,3"X10YD,LF,40/CS: Brand: CURAD

## (undated) DEVICE — ANTERIOR CERVICAL-SMH: Brand: MEDLINE INDUSTRIES, INC.

## (undated) DEVICE — Device

## (undated) DEVICE — SUTURE MCRYL SZ 2-0 L36IN ABSRB UD L36MM CT-1 1/2 CIR Y945H

## (undated) DEVICE — EXTREMITY-MRMC: Brand: MEDLINE INDUSTRIES, INC.

## (undated) DEVICE — DRILL BIT, AO DIA2.0MM X 135MM, SCALED: Brand: VARIAX

## (undated) DEVICE — SNAP KOVER: Brand: UNBRANDED

## (undated) DEVICE — APPLICATOR MEDICATED 26 CC SOLUTION HI LT ORNG CHLORAPREP

## (undated) DEVICE — DRAIN SURG FLAT W7MMXL20CM FULL PERF

## (undated) DEVICE — TOOL 14MH30 LEGEND 14CM 3MM: Brand: MIDAS REX ™

## (undated) DEVICE — DRESSING STERILE PETRO W3XL8IN N ADH OIL EMUL GZ CURAD

## (undated) DEVICE — SURGIFOAM SPNG SZ 12-7

## (undated) DEVICE — SOLUTION IRRIG 1000ML 0.9% SOD CHL USP POUR PLAS BTL

## (undated) DEVICE — C-ARMOR C-ARM EQUIPMENT COVERS CLEAR STERILE UNIVERSAL FIT 12 PER CASE: Brand: C-ARMOR

## (undated) DEVICE — SOLUTION IV 250ML 0.9% SOD CHL CLR INJ FLX BG CONT PRT CLSR

## (undated) DEVICE — STRIP,CLOSURE,WOUND,MEDI-STRIP,1/2X4: Brand: MEDLINE

## (undated) DEVICE — SOLUTION IRRIG 1000ML STRL H2O USP PLAS POUR BTL

## (undated) DEVICE — UNTHREADED GUIDE WIRE: Brand: FIXOS

## (undated) DEVICE — GLOVE SURG SZ 85 L12IN FNGR THK79MIL GRN LTX FREE

## (undated) DEVICE — SUTURE ETHLN SZ 2-0 L30IN NONABSORBABLE BLK L36MM PSLX 3/8 1697H

## (undated) DEVICE — PAD,ABDOMINAL,5"X9",ST,LF,25/BX: Brand: MEDLINE INDUSTRIES, INC.

## (undated) DEVICE — PAD,NON-ADHERENT,3X8,STERILE,LF,1/PK: Brand: MEDLINE

## (undated) DEVICE — TUBING SUCT 12FR MAL ALUM SHFT FN CAP VENT UNIV CONN W/ OBT

## (undated) DEVICE — COVER,MAYO STAND,STERILE: Brand: MEDLINE

## (undated) DEVICE — TUBING, SUCTION, 1/4" X 12', STRAIGHT: Brand: MEDLINE

## (undated) DEVICE — DRILL BIT, AO DIA2.6MM X 135MM, SCALED: Brand: VARIAX

## (undated) DEVICE — GLOVE ORTHO 8   MSG9480

## (undated) DEVICE — SUTURE STRATAFIX SPRL MCRYL + SZ 3 0 L8IN ABSRB UD L26MM SH SXMP1B427

## (undated) DEVICE — GOWN,SIRUS,NONRNF,SETINSLV,2XL,18/CS: Brand: MEDLINE

## (undated) DEVICE — BANDAGE COBAN 4 IN COMPR W4INXL5YD FOAM COHESIVE QUIK STK SELF ADH SFT

## (undated) DEVICE — C-ARM: Brand: UNBRANDED

## (undated) DEVICE — BANDAGE COMPR W6INXL5YD WHT BGE POLY COT M E WRP WV HK AND